# Patient Record
Sex: MALE | Race: WHITE | NOT HISPANIC OR LATINO | Employment: FULL TIME | URBAN - METROPOLITAN AREA
[De-identification: names, ages, dates, MRNs, and addresses within clinical notes are randomized per-mention and may not be internally consistent; named-entity substitution may affect disease eponyms.]

---

## 2023-09-15 ENCOUNTER — HOSPITAL ENCOUNTER (OUTPATIENT)
Dept: DATA CONVERSION | Facility: HOSPITAL | Age: 48
Discharge: HOME | End: 2023-09-15
Payer: COMMERCIAL

## 2023-09-15 DIAGNOSIS — Z00.00 ENCOUNTER FOR GENERAL ADULT MEDICAL EXAMINATION WITHOUT ABNORMAL FINDINGS: ICD-10-CM

## 2023-09-15 DIAGNOSIS — Z11.59 ENCOUNTER FOR SCREENING FOR OTHER VIRAL DISEASES: ICD-10-CM

## 2023-09-15 DIAGNOSIS — E55.9 VITAMIN D DEFICIENCY, UNSPECIFIED: ICD-10-CM

## 2023-09-15 DIAGNOSIS — Z13.29 ENCOUNTER FOR SCREENING FOR OTHER SUSPECTED ENDOCRINE DISORDER: ICD-10-CM

## 2023-09-15 DIAGNOSIS — Z12.5 ENCOUNTER FOR SCREENING FOR MALIGNANT NEOPLASM OF PROSTATE: ICD-10-CM

## 2023-09-15 DIAGNOSIS — R25.2 CRAMP AND SPASM: ICD-10-CM

## 2023-09-15 DIAGNOSIS — E78.5 HYPERLIPIDEMIA, UNSPECIFIED: ICD-10-CM

## 2023-09-15 LAB
25(OH)D3 SERPL-MCNC: 30 NG/ML (ref 31–100)
ALBUMIN SERPL-MCNC: 4.2 GM/DL (ref 3.5–5)
ALBUMIN/GLOB SERPL: 1.4 RATIO (ref 1.5–3)
ALP BLD-CCNC: 73 U/L (ref 35–125)
ALT SERPL-CCNC: 24 U/L (ref 5–40)
ANION GAP SERPL CALCULATED.3IONS-SCNC: 8 MMOL/L (ref 0–19)
APPEARANCE PLAS: CLEAR
AST SERPL-CCNC: 17 U/L (ref 5–40)
BILIRUB SERPL-MCNC: 0.2 MG/DL (ref 0.1–1.2)
BUN SERPL-MCNC: 26 MG/DL (ref 8–25)
BUN/CREAT SERPL: 26 RATIO (ref 8–21)
CALCIUM SERPL-MCNC: 9.5 MG/DL (ref 8.5–10.4)
CHLORIDE SERPL-SCNC: 108 MMOL/L (ref 97–107)
CHOLEST SERPL-MCNC: 135 MG/DL (ref 133–200)
CHOLEST/HDLC SERPL: 3.6 RATIO
CO2 SERPL-SCNC: 28 MMOL/L (ref 24–31)
COLOR SPUN FLD: YELLOW
CREAT SERPL-MCNC: 1 MG/DL (ref 0.4–1.6)
DEPRECATED RDW RBC AUTO: 38.5 FL (ref 37–54)
ERYTHROCYTE [DISTWIDTH] IN BLOOD BY AUTOMATED COUNT: 12.3 % (ref 11.7–15)
FASTING STATUS PATIENT QL REPORTED: ABNORMAL
GFR SERPL CREATININE-BSD FRML MDRD: 93 ML/MIN/1.73 M2
GLOBULIN SER-MCNC: 3 G/DL (ref 1.9–3.7)
GLUCOSE SERPL-MCNC: 113 MG/DL (ref 65–99)
HCT VFR BLD AUTO: 45.5 % (ref 41–50)
HCV AB SER QL: NORMAL
HDLC SERPL-MCNC: 37 MG/DL
HGB BLD-MCNC: 15.7 GM/DL (ref 13.5–16.5)
LDLC SERPL CALC-MCNC: 62 MG/DL (ref 65–130)
MAGNESIUM SERPL-MCNC: 1.9 MG/DL (ref 1.6–3.1)
MCH RBC QN AUTO: 29.5 PG (ref 26–34)
MCHC RBC AUTO-ENTMCNC: 34.5 % (ref 31–37)
MCV RBC AUTO: 85.5 FL (ref 80–100)
NRBC BLD-RTO: 0 /100 WBC
PLATELET # BLD AUTO: 193 K/UL (ref 150–450)
PMV BLD AUTO: 11.1 CU (ref 7–12.6)
POTASSIUM SERPL-SCNC: 5.6 MMOL/L (ref 3.4–5.1)
PROT SERPL-MCNC: 7.2 G/DL (ref 5.9–7.9)
PSA SERPL-MCNC: 1.4 NG/ML (ref 0–4.1)
RBC # BLD AUTO: 5.32 M/UL (ref 4.5–5.5)
SODIUM SERPL-SCNC: 144 MMOL/L (ref 133–145)
TRIGL SERPL-MCNC: 182 MG/DL (ref 40–150)
TSH SERPL DL<=0.05 MIU/L-ACNC: 0.84 MIU/L (ref 0.27–4.2)
WBC # BLD AUTO: 5.4 K/UL (ref 4.5–11)

## 2023-10-10 DIAGNOSIS — E11.65 TYPE 2 DIABETES MELLITUS WITH HYPERGLYCEMIA, WITHOUT LONG-TERM CURRENT USE OF INSULIN (MULTI): Primary | ICD-10-CM

## 2023-10-13 ENCOUNTER — TELEPHONE (OUTPATIENT)
Dept: PRIMARY CARE | Facility: CLINIC | Age: 48
End: 2023-10-13
Payer: COMMERCIAL

## 2023-10-13 RX ORDER — TIRZEPATIDE 5 MG/.5ML
0.5 INJECTION, SOLUTION SUBCUTANEOUS
Qty: 2 ML | Refills: 1 | Status: SHIPPED | OUTPATIENT
Start: 2023-10-13 | End: 2023-12-27

## 2023-10-13 RX ORDER — TIRZEPATIDE 5 MG/.5ML
2 INJECTION, SOLUTION SUBCUTANEOUS
COMMUNITY
Start: 2023-07-18 | End: 2023-10-13 | Stop reason: SDUPTHER

## 2023-11-15 PROBLEM — S46.011A STRAIN OF RIGHT ROTATOR CUFF CAPSULE: Status: ACTIVE | Noted: 2023-11-15

## 2023-11-15 PROBLEM — M19.019 DJD OF AC (ACROMIOCLAVICULAR) JOINT: Status: ACTIVE | Noted: 2023-11-15

## 2023-11-15 PROBLEM — M95.8 WINGED SCAPULA OF BOTH SIDES: Status: ACTIVE | Noted: 2023-11-15

## 2023-11-15 PROBLEM — S49.90XA INJURY OF GLENOID LABRUM: Status: ACTIVE | Noted: 2023-11-15

## 2023-11-15 PROBLEM — S46.211A STRAIN OF RIGHT BICEPS: Status: ACTIVE | Noted: 2023-11-15

## 2023-11-15 PROBLEM — M25.511 RIGHT SHOULDER PAIN: Status: ACTIVE | Noted: 2023-11-15

## 2023-11-15 NOTE — PATIENT INSTRUCTIONS
May alternate moist heat and ice as needed for pain management   Once again,reviewed handout rotator cuff injury and/or labral injury in detail with the patient to the level of their understanding; a copy of this handout was provided to the patient at the time of this office visit.    Continue Physical Therapy 1-2 times a week for 8-10 weeks with manual therapy as well as dry needling and IASTM    Reviewed home exercises to be performed by the patient routinely, including wall crawls, circumduction exercises, resistance band exercises, as well as additional exercises from rotator cuff and/or labral    Once again, recommendation over-the-counter calcium with vitamin-D 2 -3000+ milligrams a day, as well as OTC symphytum as directed daily to promote bony healing, in addition to a daily multivitamin.    Once again, recommendation over-the-counter curcumin, turmeric, boswellia, as well as egg shell membrane as directed to aid with joint inflammation.    Once again, recommendation over-the-counter Move Free for joint health.    May alternate between Advil liquid gels and Tylenol every 6-8 hours to alleviate pain as needed.    Patient advised regarding the risks and/or potential adverse reactions and/or side effects of any prescribed medications along with any over-the-counter medications or any supplements used. Patient advised to seek immediate medical care if any adverse reactions occur. The patient and/or patient(s) parent(s) verbalized their understanding.  Once again, discussed in detail with the patient to the level of their understanding the possibility in the future of regenerative injections versus corticosteroid injections  Once again, discussed in detail with the patient to the level of their understanding the possibility in the future of MR Arthrogram of the patient's RIGHT shoulder.   Once again,  MRI of the RIGHT shoulder to rule out rotator cuff tear versus labral pathology as well as further evaluation of  the proximal biceps tendon versus fracture versus other  Patient aware of the possibility of surgical intervention in the future  Discussed with patient better ways and proper technique for workouts with modifications  Follow up after MR arthrogram of the RIGHT shoulder or sooner if needed.

## 2023-11-20 ENCOUNTER — OFFICE VISIT (OUTPATIENT)
Dept: SPORTS MEDICINE | Facility: CLINIC | Age: 48
End: 2023-11-20
Payer: COMMERCIAL

## 2023-11-20 VITALS
WEIGHT: 180 LBS | BODY MASS INDEX: 25.77 KG/M2 | DIASTOLIC BLOOD PRESSURE: 82 MMHG | SYSTOLIC BLOOD PRESSURE: 122 MMHG | HEIGHT: 70 IN | HEART RATE: 62 BPM

## 2023-11-20 DIAGNOSIS — Q89.9 BIRTH DEFECT: ICD-10-CM

## 2023-11-20 DIAGNOSIS — M19.019 DJD OF AC (ACROMIOCLAVICULAR) JOINT: ICD-10-CM

## 2023-11-20 DIAGNOSIS — M95.8 WINGED SCAPULA OF BOTH SIDES: ICD-10-CM

## 2023-11-20 DIAGNOSIS — S46.011D STRAIN OF RIGHT ROTATOR CUFF CAPSULE, SUBSEQUENT ENCOUNTER: ICD-10-CM

## 2023-11-20 DIAGNOSIS — S49.91XD INJURY OF GLENOID LABRUM, RIGHT, SUBSEQUENT ENCOUNTER: ICD-10-CM

## 2023-11-20 DIAGNOSIS — M25.511 RIGHT SHOULDER PAIN, UNSPECIFIED CHRONICITY: ICD-10-CM

## 2023-11-20 DIAGNOSIS — S46.211D STRAIN OF RIGHT BICEPS, SUBSEQUENT ENCOUNTER: ICD-10-CM

## 2023-11-20 PROCEDURE — 1036F TOBACCO NON-USER: CPT | Performed by: FAMILY MEDICINE

## 2023-11-20 PROCEDURE — 99214 OFFICE O/P EST MOD 30 MIN: CPT | Performed by: FAMILY MEDICINE

## 2023-11-20 RX ORDER — LANCETS 30 GAUGE
1 EACH MISCELLANEOUS DAILY
COMMUNITY
Start: 2022-12-16

## 2023-11-20 RX ORDER — TESTOSTERONE 20.25 MG/1.25G
1 GEL TOPICAL DAILY
COMMUNITY
Start: 2018-02-14 | End: 2024-01-18 | Stop reason: ALTCHOICE

## 2023-11-20 RX ORDER — LANCETS 33 GAUGE
1 EACH MISCELLANEOUS DAILY
COMMUNITY
Start: 2023-04-18 | End: 2024-06-07 | Stop reason: ALTCHOICE

## 2023-11-20 RX ORDER — IBUPROFEN 400 MG/1
400 TABLET ORAL EVERY 6 HOURS PRN
COMMUNITY

## 2023-11-20 RX ORDER — ERGOCALCIFEROL (VITAMIN D2) 50 MCG
1 CAPSULE ORAL EVERY 24 HOURS
COMMUNITY
Start: 2023-09-17 | End: 2024-01-18 | Stop reason: ALTCHOICE

## 2023-11-20 RX ORDER — TRIAMCINOLONE ACETONIDE 1 MG/G
1 CREAM TOPICAL 2 TIMES DAILY
COMMUNITY
Start: 2023-03-05 | End: 2024-01-18 | Stop reason: ALTCHOICE

## 2023-11-20 RX ORDER — DAPAGLIFLOZIN 10 MG/1
10 TABLET, FILM COATED ORAL DAILY
COMMUNITY
End: 2024-01-03 | Stop reason: SDUPTHER

## 2023-11-20 RX ORDER — PANTOPRAZOLE SODIUM 40 MG/1
40 TABLET, DELAYED RELEASE ORAL DAILY
COMMUNITY

## 2023-11-20 RX ORDER — BLOOD SUGAR DIAGNOSTIC
1 STRIP MISCELLANEOUS DAILY
COMMUNITY
End: 2024-06-07 | Stop reason: ALTCHOICE

## 2023-11-20 ASSESSMENT — PAIN DESCRIPTION - DESCRIPTORS: DESCRIPTORS: ACHING;SHARP;STABBING

## 2023-11-20 ASSESSMENT — PAIN SCALES - GENERAL: PAINLEVEL_OUTOF10: 1

## 2023-11-20 ASSESSMENT — PAIN - FUNCTIONAL ASSESSMENT: PAIN_FUNCTIONAL_ASSESSMENT: 0-10

## 2023-12-27 DIAGNOSIS — E11.65 TYPE 2 DIABETES MELLITUS WITH HYPERGLYCEMIA, WITHOUT LONG-TERM CURRENT USE OF INSULIN (MULTI): ICD-10-CM

## 2023-12-27 RX ORDER — TIRZEPATIDE 5 MG/.5ML
INJECTION, SOLUTION SUBCUTANEOUS
Qty: 2 ML | Refills: 2 | Status: SHIPPED | OUTPATIENT
Start: 2023-12-27 | End: 2024-01-18 | Stop reason: WASHOUT

## 2024-01-03 DIAGNOSIS — E11.65 TYPE 2 DIABETES MELLITUS WITH HYPERGLYCEMIA, WITHOUT LONG-TERM CURRENT USE OF INSULIN (MULTI): Primary | ICD-10-CM

## 2024-01-03 RX ORDER — DAPAGLIFLOZIN 10 MG/1
10 TABLET, FILM COATED ORAL DAILY
Qty: 30 TABLET | Refills: 2 | Status: SHIPPED | OUTPATIENT
Start: 2024-01-03 | End: 2024-06-07 | Stop reason: WASHOUT

## 2024-01-18 ENCOUNTER — OFFICE VISIT (OUTPATIENT)
Dept: PRIMARY CARE | Facility: CLINIC | Age: 49
End: 2024-01-18
Payer: COMMERCIAL

## 2024-01-18 ENCOUNTER — LAB (OUTPATIENT)
Dept: LAB | Facility: LAB | Age: 49
End: 2024-01-18
Payer: COMMERCIAL

## 2024-01-18 VITALS
HEIGHT: 70 IN | RESPIRATION RATE: 16 BRPM | WEIGHT: 188.6 LBS | HEART RATE: 71 BPM | BODY MASS INDEX: 27 KG/M2 | OXYGEN SATURATION: 96 % | SYSTOLIC BLOOD PRESSURE: 117 MMHG | DIASTOLIC BLOOD PRESSURE: 83 MMHG

## 2024-01-18 DIAGNOSIS — M79.10 MUSCLE PAIN: ICD-10-CM

## 2024-01-18 DIAGNOSIS — E11.9 TYPE 2 DIABETES MELLITUS WITHOUT COMPLICATION, WITHOUT LONG-TERM CURRENT USE OF INSULIN (MULTI): ICD-10-CM

## 2024-01-18 DIAGNOSIS — H53.9 CHANGES IN VISION: ICD-10-CM

## 2024-01-18 DIAGNOSIS — Z09 FOLLOW-UP EXAM, 3-6 MONTHS SINCE PREVIOUS EXAM: ICD-10-CM

## 2024-01-18 DIAGNOSIS — E11.9 TYPE 2 DIABETES MELLITUS WITHOUT COMPLICATION, WITHOUT LONG-TERM CURRENT USE OF INSULIN (MULTI): Primary | ICD-10-CM

## 2024-01-18 DIAGNOSIS — E87.5 HYPERKALEMIA: ICD-10-CM

## 2024-01-18 DIAGNOSIS — E11.65 TYPE 2 DIABETES MELLITUS WITH HYPERGLYCEMIA, WITHOUT LONG-TERM CURRENT USE OF INSULIN (MULTI): ICD-10-CM

## 2024-01-18 PROBLEM — G47.33 OSA (OBSTRUCTIVE SLEEP APNEA): Status: ACTIVE | Noted: 2024-01-18

## 2024-01-18 PROBLEM — K21.9 GASTROESOPHAGEAL REFLUX DISEASE WITHOUT ESOPHAGITIS: Status: ACTIVE | Noted: 2024-01-18

## 2024-01-18 PROBLEM — E29.1 PRIMARY HYPOGONADISM IN MALE: Status: ACTIVE | Noted: 2024-01-18

## 2024-01-18 PROBLEM — E78.1 PURE HYPERTRIGLYCERIDEMIA: Status: ACTIVE | Noted: 2024-01-18

## 2024-01-18 PROBLEM — E55.9 VITAMIN D DEFICIENCY DISEASE: Status: ACTIVE | Noted: 2024-01-18

## 2024-01-18 LAB
ALBUMIN SERPL-MCNC: 4.4 G/DL (ref 3.5–5)
ALP BLD-CCNC: 75 U/L (ref 35–125)
ALT SERPL-CCNC: 27 U/L (ref 5–40)
ANION GAP SERPL CALC-SCNC: 9 MMOL/L
AST SERPL-CCNC: 21 U/L (ref 5–40)
BILIRUB SERPL-MCNC: 0.3 MG/DL (ref 0.1–1.2)
BUN SERPL-MCNC: 27 MG/DL (ref 8–25)
CALCIUM SERPL-MCNC: 9.7 MG/DL (ref 8.5–10.4)
CHLORIDE SERPL-SCNC: 103 MMOL/L (ref 97–107)
CO2 SERPL-SCNC: 28 MMOL/L (ref 24–31)
CREAT SERPL-MCNC: 1.1 MG/DL (ref 0.4–1.6)
CREAT UR-MCNC: 125.9 MG/DL
EGFRCR SERPLBLD CKD-EPI 2021: 83 ML/MIN/1.73M*2
EST. AVERAGE GLUCOSE BLD GHB EST-MCNC: 114 MG/DL
GLUCOSE SERPL-MCNC: 112 MG/DL (ref 65–99)
HBA1C MFR BLD: 5.6 %
MAGNESIUM SERPL-MCNC: 2.1 MG/DL (ref 1.6–3.1)
MICROALBUMIN UR-MCNC: <12 MG/L (ref 0–23)
MICROALBUMIN/CREAT UR: NORMAL MG/G{CREAT}
POTASSIUM SERPL-SCNC: 5.1 MMOL/L (ref 3.4–5.1)
PROT SERPL-MCNC: 6.8 G/DL (ref 5.9–7.9)
SODIUM SERPL-SCNC: 140 MMOL/L (ref 133–145)

## 2024-01-18 PROCEDURE — 36415 COLL VENOUS BLD VENIPUNCTURE: CPT

## 2024-01-18 PROCEDURE — 82570 ASSAY OF URINE CREATININE: CPT

## 2024-01-18 PROCEDURE — 83036 HEMOGLOBIN GLYCOSYLATED A1C: CPT

## 2024-01-18 PROCEDURE — 1036F TOBACCO NON-USER: CPT

## 2024-01-18 PROCEDURE — 3079F DIAST BP 80-89 MM HG: CPT

## 2024-01-18 PROCEDURE — 83735 ASSAY OF MAGNESIUM: CPT

## 2024-01-18 PROCEDURE — 99213 OFFICE O/P EST LOW 20 MIN: CPT

## 2024-01-18 PROCEDURE — 80053 COMPREHEN METABOLIC PANEL: CPT

## 2024-01-18 PROCEDURE — 3074F SYST BP LT 130 MM HG: CPT

## 2024-01-18 PROCEDURE — 82043 UR ALBUMIN QUANTITATIVE: CPT

## 2024-01-18 RX ORDER — TIRZEPATIDE 7.5 MG/.5ML
7.5 INJECTION, SOLUTION SUBCUTANEOUS
COMMUNITY
End: 2024-01-18 | Stop reason: SDUPTHER

## 2024-01-18 ASSESSMENT — PAIN SCALES - GENERAL: PAINLEVEL: 0-NO PAIN

## 2024-01-18 ASSESSMENT — PATIENT HEALTH QUESTIONNAIRE - PHQ9
1. LITTLE INTEREST OR PLEASURE IN DOING THINGS: NOT AT ALL
SUM OF ALL RESPONSES TO PHQ9 QUESTIONS 1 AND 2: 0
2. FEELING DOWN, DEPRESSED OR HOPELESS: NOT AT ALL

## 2024-01-18 ASSESSMENT — COLUMBIA-SUICIDE SEVERITY RATING SCALE - C-SSRS
1. IN THE PAST MONTH, HAVE YOU WISHED YOU WERE DEAD OR WISHED YOU COULD GO TO SLEEP AND NOT WAKE UP?: NO
6. HAVE YOU EVER DONE ANYTHING, STARTED TO DO ANYTHING, OR PREPARED TO DO ANYTHING TO END YOUR LIFE?: NO
2. HAVE YOU ACTUALLY HAD ANY THOUGHTS OF KILLING YOURSELF?: NO

## 2024-01-18 ASSESSMENT — ENCOUNTER SYMPTOMS
LOSS OF SENSATION IN FEET: 0
OCCASIONAL FEELINGS OF UNSTEADINESS: 0
DEPRESSION: 0

## 2024-01-18 NOTE — PATIENT INSTRUCTIONS
Try Biotin for hair loss.   Decrease intake of saturated fats, fast food, sweets.  Increase intake of fresh fruit fresh vegetables and lean meats.  Increase healthy fats seeds, nuts, olive oil instead of butter.  walk 150 minutes/week for heart health.    Continue current medication.  Continue work on diet - recommend lots of fruits and vegetables, lean protein like chicken, turkey, fish, beans and Greek yogurt. Try to choose healthier carbohydrate options like oatmeal, wheat bread and pasta, sweet potatoes. Limit sugary treats.  Check a fasting sugar first thing in the AM twice daily and keep a log of the results to bring to your next office visit.  Please contact office if your sugars are consistently >140.  Reevaluate in 3 months.

## 2024-01-18 NOTE — PROGRESS NOTES
"Subjective   Patient ID: Juan Rodriguez is a 48 y.o. male who presents for Follow-up.    HPI   Patient presents for 3-month follow-up.  Typically blood sugars run 98-1 33 after eating.  He notes he continues to eat high-protein diet with lots of raspberries and strawberries.  He did not follow-up with neurology as referred last time for vision changes.  But he notes it has not happened until \"maybe this morning\".  He does have an MRI scheduled to beginning of February for his right shoulder he is currently working with Dr. David Admits to urinary frequency and urgency.  He does continue to have left shin muscle soreness and now right forearm muscle soreness.  He would like to increase his dose of Mounjaro if possible.He notes that over the past 2-1/2 months he has not been taking his Farxiga or metformin and would like to remain off of them if his A1c is okay.  Denies any chest pain, chest pressure, shortness of breath, constipation, diarrhea, blood in stool, blood in urine, numbness or tingling in fingers or toes.  Denies any falls, urgent care, ER, hospitalization, new diagnoses or surgeries since he was here last.  Review of Systems  Review of Systems negative except as noted in HPI and Chief complaint.    Current Outpatient Medications:     albuterol 108 (90 Base) MCG/ACT inhaler, Inhale 1 puff every 4 hours., Disp: , Rfl:     Farxiga 10 mg, Take 1 tablet (10 mg) by mouth once daily., Disp: 30 tablet, Rfl: 2    ibuprofen 400 mg tablet, Take 1 tablet (400 mg) by mouth every 6 hours if needed., Disp: , Rfl:     OneTouch Delica Plus Lancet 30 gauge misc, Inject 1 each under the skin once daily., Disp: , Rfl:     OneTouch Ultra Test strip, Inject 1 each under the skin once daily., Disp: , Rfl:     OneTouch Ultra2 Meter misc, Inject 1 each under the skin once daily., Disp: , Rfl:     pantoprazole (ProtoNix) 40 mg EC tablet, Take 1 tablet (40 mg) by mouth once daily., Disp: , Rfl:       Objective   /83 (BP " "Location: Left arm, Patient Position: Sitting, BP Cuff Size: Adult)   Pulse 71   Resp 16   Ht 1.778 m (5' 10\")   Wt 85.5 kg (188 lb 9.6 oz)   SpO2 96%   BMI 27.06 kg/m²     Physical Exam  Vitals reviewed.   Constitutional:       Appearance: Normal appearance.   Cardiovascular:      Rate and Rhythm: Normal rate.   Pulmonary:      Effort: Pulmonary effort is normal.      Breath sounds: Normal breath sounds.   Musculoskeletal:         General: Normal range of motion.   Neurological:      General: No focal deficit present.      Mental Status: He is alert and oriented to person, place, and time. Mental status is at baseline.   Psychiatric:         Mood and Affect: Mood normal.         Behavior: Behavior normal.         Thought Content: Thought content normal.         Judgment: Judgment normal.     Assessment/Plan   Diagnoses and all orders for this visit:  Type 2 diabetes mellitus without complication, without long-term current use of insulin (CMS/HCC)  -     Hemoglobin A1C; Future  -     Albumin , Urine Random; Future  Follow-up exam, 3-6 months since previous exam  Hyperkalemia  -     Comprehensive metabolic panel; Future  Type 2 diabetes mellitus with hyperglycemia, without long-term current use of insulin (CMS/HCC)  Muscle pain  -     Magnesium; Future  -     Referral to Neurology; Future  Changes in vision  -     Referral to Neurology; Future  Other orders  -     Follow Up In Primary Care - Health Maintenance; Future  Try Biotin for hair loss.   Decrease intake of saturated fats, fast food, sweets.  Increase intake of fresh fruit fresh vegetables and lean meats.  Increase healthy fats seeds, nuts, olive oil instead of butter.  walk 150 minutes/week for heart health.    Continue current medication.  Continue work on diet - recommend lots of fruits and vegetables, lean protein like chicken, turkey, fish, beans and Greek yogurt. Try to choose healthier carbohydrate options like oatmeal, wheat bread and pasta, " sweet potatoes. Limit sugary treats.  Check a fasting sugar first thing in the AM twice daily and keep a log of the results to bring to your next office visit.  Please contact office if your sugars are consistently >140.  Reevaluate in 3 months.   *This note was dictated using DRAGON speech recognition software and was corrected for spelling or grammatical errors, but despite proofreading several typographical errors might be present that might affect the meaning of the content.*  Arabella Beltran, CNP

## 2024-01-19 RX ORDER — TIRZEPATIDE 7.5 MG/.5ML
7.5 INJECTION, SOLUTION SUBCUTANEOUS
Qty: 2 ML | Refills: 2 | Status: SHIPPED | OUTPATIENT
Start: 2024-01-19 | End: 2024-02-29 | Stop reason: SINTOL

## 2024-02-02 ENCOUNTER — APPOINTMENT (OUTPATIENT)
Dept: RADIOLOGY | Facility: HOSPITAL | Age: 49
End: 2024-02-02
Payer: COMMERCIAL

## 2024-02-12 DIAGNOSIS — E11.9 TYPE 2 DIABETES MELLITUS WITHOUT COMPLICATION, WITHOUT LONG-TERM CURRENT USE OF INSULIN (MULTI): ICD-10-CM

## 2024-02-12 RX ORDER — METFORMIN HYDROCHLORIDE 1000 MG/1
1000 TABLET ORAL
COMMUNITY
End: 2024-02-12 | Stop reason: SDUPTHER

## 2024-02-12 RX ORDER — METFORMIN HYDROCHLORIDE 1000 MG/1
1000 TABLET ORAL
Qty: 180 TABLET | Refills: 1 | Status: SHIPPED | OUTPATIENT
Start: 2024-02-12 | End: 2024-06-07 | Stop reason: WASHOUT

## 2024-02-23 ENCOUNTER — HOSPITAL ENCOUNTER (OUTPATIENT)
Dept: RADIOLOGY | Facility: HOSPITAL | Age: 49
Discharge: HOME | End: 2024-02-23
Payer: COMMERCIAL

## 2024-02-23 VITALS
SYSTOLIC BLOOD PRESSURE: 142 MMHG | DIASTOLIC BLOOD PRESSURE: 85 MMHG | OXYGEN SATURATION: 100 % | HEART RATE: 59 BPM | RESPIRATION RATE: 17 BRPM

## 2024-02-23 DIAGNOSIS — S49.91XD INJURY OF GLENOID LABRUM, RIGHT, SUBSEQUENT ENCOUNTER: ICD-10-CM

## 2024-02-23 DIAGNOSIS — S46.211D STRAIN OF RIGHT BICEPS, SUBSEQUENT ENCOUNTER: ICD-10-CM

## 2024-02-23 DIAGNOSIS — S46.011D STRAIN OF RIGHT ROTATOR CUFF CAPSULE, SUBSEQUENT ENCOUNTER: ICD-10-CM

## 2024-02-23 DIAGNOSIS — M25.511 RIGHT SHOULDER PAIN, UNSPECIFIED CHRONICITY: ICD-10-CM

## 2024-02-23 DIAGNOSIS — Q89.9 BIRTH DEFECT: ICD-10-CM

## 2024-02-23 DIAGNOSIS — M19.019 DJD OF AC (ACROMIOCLAVICULAR) JOINT: ICD-10-CM

## 2024-02-23 DIAGNOSIS — M95.8 WINGED SCAPULA OF BOTH SIDES: ICD-10-CM

## 2024-02-23 PROCEDURE — 73222 MRI JOINT UPR EXTREM W/DYE: CPT | Mod: RIGHT SIDE | Performed by: RADIOLOGY

## 2024-02-23 PROCEDURE — 73040 CONTRAST X-RAY OF SHOULDER: CPT | Mod: RT

## 2024-02-23 PROCEDURE — 77002 NEEDLE LOCALIZATION BY XRAY: CPT | Mod: RIGHT SIDE | Performed by: RADIOLOGY

## 2024-02-23 PROCEDURE — A9575 INJ GADOTERATE MEGLUMI 0.1ML: HCPCS | Performed by: FAMILY MEDICINE

## 2024-02-23 PROCEDURE — 73222 MRI JOINT UPR EXTREM W/DYE: CPT | Mod: RT

## 2024-02-23 PROCEDURE — 2550000001 HC RX 255 CONTRASTS: Performed by: FAMILY MEDICINE

## 2024-02-23 PROCEDURE — 23350 INJECTION FOR SHOULDER X-RAY: CPT | Mod: RIGHT SIDE | Performed by: RADIOLOGY

## 2024-02-23 RX ORDER — GADOTERATE MEGLUMINE 376.9 MG/ML
0.1 INJECTION INTRAVENOUS
Status: COMPLETED | OUTPATIENT
Start: 2024-02-23 | End: 2024-02-23

## 2024-02-23 RX ORDER — BUPIVACAINE HYDROCHLORIDE 2.5 MG/ML
INJECTION, SOLUTION EPIDURAL; INFILTRATION; INTRACAUDAL
Status: DISCONTINUED
Start: 2024-02-23 | End: 2024-02-23 | Stop reason: WASHOUT

## 2024-02-23 RX ORDER — TRIAMCINOLONE ACETONIDE 40 MG/ML
INJECTION, SUSPENSION INTRA-ARTICULAR; INTRAMUSCULAR
Status: DISCONTINUED
Start: 2024-02-23 | End: 2024-02-23 | Stop reason: WASHOUT

## 2024-02-23 RX ORDER — LIDOCAINE HYDROCHLORIDE 10 MG/ML
8 INJECTION, SOLUTION EPIDURAL; INFILTRATION; INTRACAUDAL; PERINEURAL ONCE
Status: CANCELLED | OUTPATIENT
Start: 2024-02-23 | End: 2024-02-23

## 2024-02-23 RX ORDER — LIDOCAINE HYDROCHLORIDE 10 MG/ML
5 INJECTION, SOLUTION EPIDURAL; INFILTRATION; INTRACAUDAL; PERINEURAL ONCE
Status: CANCELLED | OUTPATIENT
Start: 2024-02-23 | End: 2024-02-23

## 2024-02-23 RX ADMIN — IOHEXOL 3 ML: 240 INJECTION, SOLUTION INTRATHECAL; INTRAVASCULAR; INTRAVENOUS; ORAL at 11:58

## 2024-02-23 RX ADMIN — GADOTERATE MEGLUMINE 0.1 ML: 376.9 INJECTION INTRAVENOUS at 11:58

## 2024-02-23 ASSESSMENT — PAIN - FUNCTIONAL ASSESSMENT: PAIN_FUNCTIONAL_ASSESSMENT: 0-10

## 2024-02-23 ASSESSMENT — PAIN SCALES - GENERAL: PAINLEVEL_OUTOF10: 0 - NO PAIN

## 2024-02-23 NOTE — DISCHARGE INSTRUCTIONS
Take it easy today.  No heavy lifting. Nothing heavier than a gallon of milk.  May drive.  Remove dressing tomorrow and you may shower tomorrow.  May take tylenol as needed for pain.  Follow up with Dr. David   In 10 days

## 2024-02-23 NOTE — POST-PROCEDURE NOTE
Interventional Radiology Brief Postprocedure Note    Attending: Jono Stevens MD     Assistant: None    Diagnosis: Right shoulder pain    Description of procedure: Fluoroscopically guided right shoulder pre imaging arthrogram injection     Anesthesia:  Local    Complications: None    Estimated Blood Loss: minimal    Medications (Filter: Administrations occurring from 1133 to 1153 on 02/23/24) As of 02/23/24 1153      None          No specimens collected      See detailed result report with images in PACS.    The patient tolerated the procedure well without incident or complication and is in stable condition.

## 2024-02-23 NOTE — PRE-PROCEDURE NOTE
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    Relevant review of systems: NA    Relevant Labs:   Lab Results   Component Value Date    CREATININE 1.10 01/18/2024    EGFR 83 01/18/2024       Planned Sedation/Anesthesia: Minimal    Airway assessment: normal    Directed physical examination:    NA    Mallampati:  NA    ASA Score: ASA 1 - Normal health patient    Benefits, risks and alternatives of procedure and planned sedation have been discussed with the patient and/or their representative. All questions answered and they agree to proceed.

## 2024-02-26 ENCOUNTER — TELEPHONE (OUTPATIENT)
Dept: SPORTS MEDICINE | Facility: CLINIC | Age: 49
End: 2024-02-26
Payer: COMMERCIAL

## 2024-02-26 NOTE — LETTER
February 26, 2024     Juan Rodriguez    Patient: Juan Rodriguez   YOB: 1975   Date of Visit: 2/26/2024     Good Morning Adria:  Please call Dr. David's office at [phone] Option #1 to schedule a follow-up appointment to discuss your MRI results.    Thanks!  Haylee Martinez, AT  Sports Medicine- Office of Dr. Mulugeta David, DO         ______________________________________________________________________________________

## 2024-02-28 DIAGNOSIS — E11.9 TYPE 2 DIABETES MELLITUS WITHOUT COMPLICATION, WITHOUT LONG-TERM CURRENT USE OF INSULIN (MULTI): Primary | ICD-10-CM

## 2024-02-28 NOTE — PROGRESS NOTES
Verbal consent of the patient and/or verbal parental consent for patients under the age of 18 have been obtained to conduct a physical examination at this office visit.    Established patient  History Of Present Illness  03/05/24 Juan Rodriguez is a 48 y.o. male who is present today  to go over his MRI of his right shoulder and reevaluation of his RIGHT shoulder. He states he has been hurting more since the contrast injection from the arthrogram. Currently he feels his pain as 1/10 and aching. He feels pain to the lateral aspect of the shoulder which worsens with planking and exercises where he must stabilize the shoulder against walls or the ground. He states that he continues to work with a  at [employer] and will modify exercises as needed. Often times he does lose strength as he is lifting logs in the yard and throwing them into a truck bed. 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 He also experiences pain when lying with his arm about his head or when his arm is elevated at a computer desk and he must raise if off. He denies any numbness or tingling in the arm. He is not currently treating his pain with any medications as he tries to avoid them so he will modify his activity when it hurts. He has not noticed any improvements.  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      All previous Progress Notes and imaging results related to this patients chief complaint have been reviewed in preparation for this examination.    Past Medical History  He has no past medical history on file.    Surgical History  He has no past surgical history on file.     Social History  He reports that he has never smoked. He has never been exposed to tobacco smoke. He has never used smokeless tobacco. He reports that he does not  drink alcohol and does not use drugs.    Family History  No family history on file.     Allergies  Penicillin v, Amoxicillin-pot clavulanate, Nyquil, and Sodium hypochlorite    Historical Clinical Intake  March 13, 2023 Verbal consent of the patient and/or verbal parental consent for patients under the age of 18 have been obtained to conduct a physical examination at this office visit. Juan is a 47 year old male who presents as a new patient who is here for an initial evaluation of his RIGHT shoulder. He initially injured his RIGHT shoulder approximately 1/2 - 2 years ago while throwing with his daughter. He had his right arm cocked back to throw the ball and felt a sharp pain primarily in the anterior aspect of his RIGHT shoulder. He denies feeling a pop, snap, or crack at that time. He denies any c/o instability. Since that time, Adria has had intermittent pain in his right shoulder that is worse with throwing and with weight-bearing activities such as a plank. He states that the pain varies from light to severe pain. Worse with throwing, weight bearing activty. Pain can get up to 8/10, currently 0/10. He denies any numbness/tingling. He is right hand dominant and works a physical job as a  and . Additionally, he does have issues with bilateral elbows that he would like evaluated at a separate visit. 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  -------------------------------------------------------  11/20/23 Juan Rodriguez is a 48 y.o. male who presents for reevaluation of his RIGHT shoulder. He has completed 6 to 8 weeks of physical Therapy and continues to perform his home exercise program as directed by PT. He reports no  improvement in functionality and pain since his last evaluation. He describes his pain as an aching, sharp and stabbing pain along the lateral aspect of his right shoulder proximal to the insertion of his biceps muscle. He notes exacerbation of pain with overhead motions, when lifting objects into his truck bed, or when putting any amount of weight onto his left arm/shoulder. He also states when throwing softball with his daughter pain will increase. He rates his pain at 1/10 today. He has a home TENS unit and takes OTC Ibuprofen and OTC Tylenol as well as applying heat and ice topically for pain management with no relief.  He says is also affecting his everyday activities as well as his sleep at night.      Review of Systems:  CONSTITUTIONAL:   Negative for weight change, loss of appetite, fatigue, weakness, fever, chills, night sweats, headaches .           HEENT:   Negative for cold, cough, sore throat, sinus pain, swollen lymph nodes.           OPHTHALMOLOGY:   Negative for diminished vision, blurred vision, loss of vision, double vision.           ALLERGY:   Negative for runny nose, scratchy throat, sinus congestion, rash, facial pressure, nasal congestion, post-nasal drip.           CARDIOLOGY:   Negative for chest pain, palpitations, murmurs, irregular heart beat, shortness of breath, leg edema, dyspnea on exertion, fatigue, dizziness.           RESPIRATORY:   Negative for chest pain, shortness of breath, swelling of the legs, asthma/copd, chest congestion, pain with breathing .           GASTROENTEROLOGY:   Negative for nausea, vomitting, heartburn, constipation, diarrhea, blood in stool, change in bowel habits, black stool.           HEMATOLOGY/LYMPH:   Negative for fatigue, loss of appetitie, easy bruising, easy bleeding, anemia, abnormal bleeding, slow healing.           ENDOCRINOLOGY:   Negative for polyuria, polydipsia, polyphagia, fatigue, weight loss, weight gain, cold intolerance, heat intolerance,  diabetes.           MUSCULOSKELETAL:   Positive  for RIGHT SHOULDER        DERMATOLOGY:   Negative for rash, bruising.           NEUROLOGY:   Negative for tingling, numbness, gait abnormality, paresthesias, weakness, sciatica.          General Examination:  GENERAL APPEARANCE: Appears well, pleasant, and cooperative, NAD.   HEENT: Normal, unremarkable.   NECK: Supple.   HEART: RRR, normal S1S2.   LUNGS: Clear to auscultation bilaterally.   ABDOMEN: Soft, NT/ND, BS present.   EXTREMITIES: POSITIVE:    Congenital abnormalities bilateral elbows  SKIN: No rash or cellulitis.   NEUROLOGIC EXAM: Awake, A&O x 3, CN's II-XII grossly intact.   PSYCH: Good eye contact, appropriate mood and affect       The Scribe, Sumi, was present in the room during the entire visit including, but not limited to the physical examination!     General (SHOULDER):  all findings improved somewhat since last visit  Location: RIGHT.   Erythema: Negative.   Edema:  Negative.   Effusion:  Negative.   Warmth:  Negative.   Ecchymosis/Bruising:  Negative.   Percussion Test:  Negative.   T[place] Test:  Negative.   Abrasions:  Negative.   Orientation: Positive Asymmetrical: At the elbows and forearms due to a congenital birth defect of shortening and anterior radial head that causes overall shortening of the forearms.   ROM: Positive: , asymmetrical, additionally noted secondary decreased range of motion of forearms into supination due to congenital birth defect BILATERAL  Forward Flexion (0-180 degrees)  Extension (0-60 degrees)  ABduction (0-180 degrees)  ADduction (30-50 degrees)  External Rotation with elbow at side (0-90 degrees) RIGHT worse than LEFT  Internal Rotation with elbow at side (0-70 degrees)  Horizontal ABduction (0-90 degrees)  Horizontal ADduction (0-45 degrees)  External Rotation with elbow at 90 degrees of ABduction [0-() degrees]  Internal Rotation with elbow at 90 degrees of ABduction [0-(70-90) degrees]  Apley's  Shoulder Scratch Test Superiorly Tests a Combination of: Flexion, External Rotation, and Scapular ABduction  Apley's Shoulder Scratch Test Inferiorly Tests a Combination of: Extension, Internal Rotation, and Scapular Adduction  LEFT arm reaches T4 RIGHT arm reaches T11            Muscle Strength:  all findings improved somewhat since last visit  Positive  +4-+5/+5: Internal Rotation - subscapularis  +4-+5/+5: External Rotation - infraspinatus and teres minor RIGHT>LEFT  +4-+5/+5: ABduction - supraspinatus and deltoid  +4-+5/+5: ABduction with thumbs down and 30 degrees horizontal ADduction - supraspinatus  +4-+5/+5:Palms up with elbow bent to 15 degrees flexion and resisted upward motion - biceps  +4-+5/+5: Simultaneous resisted supination and elbow flexion- biceps  +4-+5/+5:Flexion  +4-+5/+5:Extension  +4-+5/+5: ABduction  +4-+5/+5:ADduction  +4-+5/+5:Internal Rotation at 90 Degrees  +4-+5/+5: External Rotation at 90 Degrees  +4-+5/+5:Internal Rotation at 0 Degrees  +4-+5/+5:External Rotation at 0 Degrees  +4-+5/+5:Apley's Shoulder Scratch Test Superiorly Tests a Combination of: Flexion, External Rotation, and Scapular ABduction  +4-+5/+5: Apley's Shoulder Scratch Test Inferiorly Tests a Combination of: Extension, Internal Rotation, and Scapular ADduction  +4-+5/+5:Triceps  +4-+5/+5: Biceps            DTR/Neurological:   Negative, Symmetrical:  +2/+4 DTR's: Biceps Brachi (C5)  +2/+4 DTR's: Brachioradialis (C6)  +2/+4 DTR's: Triceps (C7).            Sensation/Neurological:    Negative, Symmetrical, Sensation intact:  C2: Lower jaw and back of head  C3: Upper neck and back of head  C4: Lower neck, upper shoulders and upper chest  C5: Area of collarbones, lateral upper arms and upper chest  C6: Lateral forearms, thumbs, anterior index finger and lateral half of the middle finger  C7: Some of posterior index finger, medial half of middle finger, upper posterior back, and back of arms  C8: Ring finger, little finger,  medial forearm and posterior upper back  T1: Medial anterior upper arm, axilla, upper chest, and posterior back     Palpation:  Positive:   still some mild Tenderness to Palpation over the rotator cuff interval and long head of the biceps tendon proximally.  all findings improved somewhat since last visit           Vascular: Negative, Symmetrical:  +2/+4: Carotid pulse  +2/+4: Radial pulse  +2/+4: Ulnar pulse  +2/+4: Brachial pulse            Negative, Symmetrical:  Capillary Refill less than 2 seconds .      Winging Scapula:  Positive: ,Bilateral.      Imaging and Diagnostics Review:  STUDY:  MR arthrogram of the right shoulder  without intravenous contrast  date 2/23/2024.      INDICATION:  Signs/Symptoms:labral tear, RTC tear, right shoulder pain      COMPARISON:  None.      ACCESSION NUMBER(S):  [id number]      ORDERING CLINICIAN:  DENIZ ARMSTRONG      TECHNIQUE:  Multiplanar multisequence MRI of the right shoulder was performed  after the fluoroscopically guided instillation of dilute gadolinium  contrast into the glenohumeral joint.      FINDINGS:  MUSCLES AND TENDONS:      There is mild articular surface fraying of the distal to insertional  supraspinatus tendon with associated mild increased intermediate  signal intensity within the substance. The infraspinatus tendon is  intact.  The teres minor tendon is intact.  There is some contrast  interdigitating into and through the substance of the subscapularis  which is felt to be iatrogenic in nature. Subscapularis tendon is  otherwise intact. The tendon of the long head of the biceps is intact  and is seated within the bicipital groove distally.  No significant  rotator cuff muscle atrophy is evident.  No mass is evident and the  suprascapular or spinoglenoid notch or the quadrangular space.      OSSEOUS STRUCTURES AND JOINTS:      There is superior glenoid labral tearing from the approximate 9:00  position in the posterosuperior quadrant to the 12:00 position.  In  the anterior superior quadrant there is contrast passing between the  glenoid labrum and the glenoid bone stock most compatible with a  sublabral foramen. The substance of the labral tissue in the anterior  superior quadrant appears to be diminutive with some associated  fraying in indistinctness such as seen at image 16 in the axial  plane. There is  no significant degenerative change of the  glenohumeral joint. There is  mild degenerative change of the  acromioclavicular joint.   The acromion is  type II  with mild  lateral downsloping.  The superior, middle, and inferior glenohumeral  ligament complexes are intact.      No fracture or dislocation is evident. Cystic changes are seen at the  greater tubercle of the humerus. Bone marrow signal intensity is  otherwise within normal limits.      SOFT TISSUES:      There is no significant volume of fluid in the subacromial subdeltoid  bursa.  There is no significant volume of fluid in the subcoracoid  bursa. Associated soft tissues of the shoulder are grossly  unremarkable.      IMPRESSION:  1. Superior glenoid labral tearing involving the posterior into  anterior labrum with superimposed sublabral foramen in the anterior  superior quadrant.  2. Mild articular surface fraying with mild tendinosis of the  supraspinatus tendon.  3. Mild acromioclavicular joint degenerative change.  4. Mild DJD gleniod humeral joint      Signed by: Jono Stevens 2/23/2024 12:57 PM  Dictation workstation:   [id number]  -------------------------------------------------  PROCEDURE: SHOULDER RT MIN 2 VIEW - TXR 0048  REASON FOR EXAM: RIGHT SHOULDER PAIN,UNSPECIFIED CHRONICITY  RESULT: MRN: [id number]     Patient Name: MAMIE DANIEL  STUDY: SHOULDER RT MIN 2 VIEW; 3/13/2023 4:48 pm  INDICATION: RIGHT SHOULDER PAIN,UNSPECIFIED CHRONICITY 47-year-old man with right shoulder pain, generalized.  COMPARISON: Left shoulder radiograph 01/04/2019  ACCESSION NUMBER(S): [id number]  ORDERING  CLINICIAN: DENIZ ARMSTRONG     TECHNIQUE: 3 views of the right shoulder were performed.     FINDINGS:   No sign of acute right shoulder fracture or dislocation. The right acromioclavicular joint space demonstrates mild narrowing. The glenohumeral joint space overall appears maintained. Incidental curvilinear radiopaque density overlying the lower neck may be related to the patient's clothing/external to the patient. Correlation with physical examination advised.            IMPRESSION:  1. Mild degenerative change of the right acromioclavicular joint.  2. No sign of acute fracture or dislocation.  3. If symptoms persist, consider MRI for further evaluation.            Dictation workstation: [id number]  Original Interpreting Physician: TOÑO DURAN MD  Original Transcribed by/Date: MMODAL Mar 13 2023 4:21P            BILATERAL elbows: possible birth defect or defect of no known origin noted of the elbows: radial heads are approximately 4 times the size limiting the patient's ability to supinate or other biomechanics involving the elbow, wrist, and possibly shoulders.     Shoulder - AC Joint:  Painful Arc 120-180 degrees:  Negative.   AC Compression Test:  Negative.   Cross Body ADduction Stress Test:  Negative.   Piano Key Sign:  Negative.   AC Distraction Test:  Negative.      Shoulder - Biceps:  Abbott-Clancy Test: Negative.   Speeds Test: Negative.   [place]anson Test: Negative.      Shoulder - Impingement:  Neer Test:  Negative.   Hawken-Kaiser Test:  Negative.   Painful Arc  degrees:  Negative.      Shoulder - Infraspinatus:  Resisted ER at 0 degrees ABduction:  Negative with arm at side.      Shoulder - Labrum/Instability: all findings improved somewhat since last visit  Anterior Apprehension Test:  Positive.   Anterior Release/Surprise Test:  Positive.   Anterior Drawer Test:  Positive.   Bicep Flexion at 90 degrees ABduction Test:  Positive.   Bicipital Load Test:  Positive.   Posterior Apprehension  "Test:  Positive.   Relocation Test:  Positive.   Sulcus Sign:  Positive.   Brand Test:  Positive.   Anterior Slide Test:  Positive.   Clunk Test:  Positive.   Grind Test:  Positive.   [place] Test:  Positive.   Crank Test:  Positive.   Load and Shift Test:  Positive.      Shoulder - Subscapularis: all findings improved somewhat since last visit  Bear Hug Test:  Negative.   Lift Off Test:  Positive.   [place] Test: Positive. patient states he starts to feel discomfort/pain with completion of this test near the site of usual pain.   Resisted Elbow Push Down Test:  Positive..   Resisted IR at 90 degrees:  Negative.   Resisted Lift Off \"\" Test:  Positive.      Shoulder - Supraspinatus: all findings improved somewhat since last visit  Full Can Test:  Positive.: with weakness while activating his traps  Empty Can Test: Positive.. with weakness  while activating his traps  Resisted Elbow Push Up Test:  Positive.  Drop Arm Test:  Negative.   Torrie Test:  Negative.   Lateral [place]rie Test:  Negative.   Zero Degree ABduction Test:  Negative.   Painful Arc  degrees:  Negative  degrees.      Shoulder - Teres Minor:  Resisted ER at 90 degrees ABduction:  Negative.      Shoulder - Vascular Thoracic Outlet Syndrome:  Shabbir Test: Negative.   Adson's Test: Negative.   Ankur's Test: Negative.   Robles Test: Negative.   [place] Test: Negative.        Assessment   1. Right shoulder pain, unspecified chronicity  Referral to Physical Therapy      2. Strain of right rotator cuff capsule, subsequent encounter  Referral to Physical Therapy      3. DJD of AC (acromioclavicular) joint  Referral to Physical Therapy      4. Osteoarthritis of right glenohumeral joint  Referral to Physical Therapy    Mild DJD gleniod humeral joint      5. Tear of right supraspinatus tendon  Referral to Physical Therapy    MRI RIGHT shoulder 2024: mild articular surface fraying with mild tendinosis of the supraspinatus tendon      6. Injury of " glenoid labrum, unspecified laterality, subsequent encounter  Referral to Physical Therapy    Superior glenoid labral tearing involving the posterior into  anterior labrum with superimposed sublabral foramen in the anterior  superior quadrant.          Treatment or Intervention:   May continue to alternate moist heat and ice as needed for pain management   Once again,reviewed rotator cuff injury and/or labral injury  and degenerative changes in detail with the patient to the level of their understanding;  at the time of this office visit.    Continue Physical Therapy for patients RIGHT shoulder including dry needling at the supra and infraspinatus areas  once again, recommendation over-the-counter  vitamin-D 2 -3000+ milligrams a day, as well as  a daily multivitamin.    Once again, recommendation over-the-counter curcumin, turmeric, boswellia, as well as egg shell membrane as directed to aid with joint inflammation.   Once again, recommendation to continue over-the-counter Move Free for joint health.    May continue to alternate between Advil liquid gels and Tylenol every 6-8 hours to alleviate pain as needed.   Patient advised regarding the risks and/or potential adverse reactions and/or side effects of any prescribed medications along with any over-the-counter medications or any supplements used. Patient advised to seek immediate medical care if any adverse reactions occur. The patient and/or patient(s) parent(s) verbalized their understanding.  Once again, discussed in detail with the patient to the level of their understanding the possibility in the future of regenerative injections versus corticosteroid injections  Patient aware of the possibility of surgical intervention in the future however at this time would like to avoid surgical intervention if possible  Discussed with patient better ways and proper technique for workouts with modifications  Reviewed RIGHT shoulder MR Arthrogram in detail with the  patient and/or patients parent/legal guardian to their level of understanding; a copy of these results were provided to the patient and/or patients parent/legal guardian at the time of this office visit.  Follow up if patient decides to undergo regenerative injections for RIGHT shoulder injury or sooner if needed.     ROHIT, Sumi Esquivel, attest this documentation has been prepared under the direction and in the presence of Deniz David D.O. By signing below, I, Deniz David D.O, personally performed the services described in this documentation. All medical record entries made by the scribe were at my direction and in my presence. I have reviewed the chart and agree that the record reflects my personal performance and is accurate and complete. Please note that this report has been partially produced using speech recognition software. It may contain errors related to grammar, punctuation or spelling. Electronically signed, but not reviewed. Deniz David D.O. Director of Sports Medicine.       DENIZ DAVID on 3/5/24 at 11:03 AM.     Deniz David DO, FAOASM

## 2024-02-29 RX ORDER — TIRZEPATIDE 5 MG/.5ML
INJECTION, SOLUTION SUBCUTANEOUS
Qty: 2 ML | Refills: 3 | Status: SHIPPED | OUTPATIENT
Start: 2024-02-29

## 2024-02-29 NOTE — TELEPHONE ENCOUNTER
Patient doesn't want to take the 7.5 dosage anymore because he is having a lot of sulfa burps and stomach pains. He wants the dosage to be back to the 5 mg. He's been taking the 7.5 weekly but they only gave him 4 pens so he only has two doses left.

## 2024-03-04 ENCOUNTER — OFFICE VISIT (OUTPATIENT)
Dept: SPORTS MEDICINE | Facility: CLINIC | Age: 49
End: 2024-03-04
Payer: COMMERCIAL

## 2024-03-04 VITALS
HEIGHT: 69 IN | DIASTOLIC BLOOD PRESSURE: 74 MMHG | WEIGHT: 182 LBS | BODY MASS INDEX: 26.96 KG/M2 | SYSTOLIC BLOOD PRESSURE: 122 MMHG | HEART RATE: 73 BPM

## 2024-03-04 DIAGNOSIS — M19.019 DJD OF AC (ACROMIOCLAVICULAR) JOINT: ICD-10-CM

## 2024-03-04 DIAGNOSIS — M75.101 TEAR OF RIGHT SUPRASPINATUS TENDON: ICD-10-CM

## 2024-03-04 DIAGNOSIS — S46.011D STRAIN OF RIGHT ROTATOR CUFF CAPSULE, SUBSEQUENT ENCOUNTER: ICD-10-CM

## 2024-03-04 DIAGNOSIS — M25.511 RIGHT SHOULDER PAIN, UNSPECIFIED CHRONICITY: Primary | ICD-10-CM

## 2024-03-04 DIAGNOSIS — M19.011 OSTEOARTHRITIS OF RIGHT GLENOHUMERAL JOINT: ICD-10-CM

## 2024-03-04 DIAGNOSIS — S49.90XD INJURY OF GLENOID LABRUM, UNSPECIFIED LATERALITY, SUBSEQUENT ENCOUNTER: ICD-10-CM

## 2024-03-04 PROCEDURE — 3062F POS MACROALBUMINURIA REV: CPT | Performed by: FAMILY MEDICINE

## 2024-03-04 PROCEDURE — 3074F SYST BP LT 130 MM HG: CPT | Performed by: FAMILY MEDICINE

## 2024-03-04 PROCEDURE — 99214 OFFICE O/P EST MOD 30 MIN: CPT | Performed by: FAMILY MEDICINE

## 2024-03-04 PROCEDURE — 3044F HG A1C LEVEL LT 7.0%: CPT | Performed by: FAMILY MEDICINE

## 2024-03-04 PROCEDURE — 3078F DIAST BP <80 MM HG: CPT | Performed by: FAMILY MEDICINE

## 2024-03-04 PROCEDURE — 1036F TOBACCO NON-USER: CPT | Performed by: FAMILY MEDICINE

## 2024-03-04 ASSESSMENT — PAIN DESCRIPTION - DESCRIPTORS: DESCRIPTORS: ACHING

## 2024-03-04 ASSESSMENT — ENCOUNTER SYMPTOMS
DEPRESSION: 0
LOSS OF SENSATION IN FEET: 0
OCCASIONAL FEELINGS OF UNSTEADINESS: 0

## 2024-03-04 ASSESSMENT — PATIENT HEALTH QUESTIONNAIRE - PHQ9
1. LITTLE INTEREST OR PLEASURE IN DOING THINGS: NOT AT ALL
2. FEELING DOWN, DEPRESSED OR HOPELESS: NOT AT ALL
SUM OF ALL RESPONSES TO PHQ9 QUESTIONS 1 AND 2: 0

## 2024-03-04 ASSESSMENT — LIFESTYLE VARIABLES
HAVE YOU OR SOMEONE ELSE BEEN INJURED AS A RESULT OF YOUR DRINKING: NO
SKIP TO QUESTIONS 9-10: 1
HOW MANY STANDARD DRINKS CONTAINING ALCOHOL DO YOU HAVE ON A TYPICAL DAY: PATIENT DOES NOT DRINK
HAS A RELATIVE, FRIEND, DOCTOR, OR ANOTHER HEALTH PROFESSIONAL EXPRESSED CONCERN ABOUT YOUR DRINKING OR SUGGESTED YOU CUT DOWN: NO
HOW OFTEN DO YOU HAVE A DRINK CONTAINING ALCOHOL: NEVER
AUDIT TOTAL SCORE: 0
AUDIT-C TOTAL SCORE: 0
HOW OFTEN DO YOU HAVE SIX OR MORE DRINKS ON ONE OCCASION: NEVER

## 2024-03-04 ASSESSMENT — PAIN - FUNCTIONAL ASSESSMENT: PAIN_FUNCTIONAL_ASSESSMENT: 0-10

## 2024-03-04 ASSESSMENT — PAIN SCALES - GENERAL
PAINLEVEL: 1
PAINLEVEL_OUTOF10: 1

## 2024-03-04 NOTE — PATIENT INSTRUCTIONS
Treatment or Intervention:   May continue to alternate moist heat and ice as needed for pain management   Once again,reviewed rotator cuff injury and/or labral injury in detail with the patient to the level of their understanding;  at the time of this office visit.    Continue Physical Therapy for patients RIGHT shoulder including dry needling at the supra and infraspinatus areas  once again, recommendation over-the-counter  vitamin-D 2 -3000+ milligrams a day, as well as  a daily multivitamin.    Once again, recommendation over-the-counter curcumin, turmeric, boswellia, as well as egg shell membrane as directed to aid with joint inflammation.   Once again, recommendation to continue over-the-counter Move Free for joint health.    May continue to alternate between Advil liquid gels and Tylenol every 6-8 hours to alleviate pain as needed.   Patient advised regarding the risks and/or potential adverse reactions and/or side effects of any prescribed medications along with any over-the-counter medications or any supplements used. Patient advised to seek immediate medical care if any adverse reactions occur. The patient and/or patient(s) parent(s) verbalized their understanding.  Once again, discussed in detail with the patient to the level of their understanding the possibility in the future of regenerative injections versus corticosteroid injections  Patient aware of the possibility of surgical intervention in the future however at this time would like to avoid surgical intervention if possible  Discussed with patient better ways and proper technique for workouts with modifications  Reviewed RIGHT shoulder MR Arthrogram in detail with the patient and/or patients parent/legal guardian to their level of understanding; a copy of these results were provided to the patient and/or patients parent/legal guardian at the time of this office visit.  Follow up if patient decides to undergo regenerative injections for RIGHT  shoulder injury or sooner if needed.

## 2024-03-05 NOTE — PROGRESS NOTES
Physical Therapy Evaluation/Treatment    Patient Name: Juan Rodriguez  MRN: [id number]  Encounter Date: 3/22/2024  Time Calculation  Start Time: 1005  Stop Time: 1053  Time Calculation (min): 48 min    Visit Number:  1/12 (including evaluation)  Visit Authorized/insurance considerations:      $500 DED-MET, 80/20 COVERAGE, 40V PT/OT, MMO TRANS # [id number]     Progress Report due visit #10      **pt needs to complete paperwork**    Current Problem:  1. Strain of right rotator cuff capsule, subsequent encounter  Referral to Physical Therapy    Follow Up In Physical Therapy      2. Right shoulder pain, unspecified chronicity  Referral to Physical Therapy    Follow Up In Physical Therapy      3. DJD of AC (acromioclavicular) joint  Referral to Physical Therapy    Follow Up In Physical Therapy      4. Osteoarthritis of right glenohumeral joint  Referral to Physical Therapy    Follow Up In Physical Therapy      5. Tear of right supraspinatus tendon  Referral to Physical Therapy    Follow Up In Physical Therapy      6. Injury of glenoid labrum  Follow Up In Physical Therapy      7. Osteoarthritis of right glenohumeral joint  Referral to Physical Therapy    Follow Up In Physical Therapy    Mild DJD gleniod humeral joint      8. Tear of right supraspinatus tendon  Referral to Physical Therapy    Follow Up In Physical Therapy    MRI RIGHT shoulder 2024: mild articular surface fraying with mild tendinosis of the supraspinatus tendon      9. Injury of glenoid labrum, unspecified laterality, subsequent encounter  Referral to Physical Therapy    Follow Up In Physical Therapy    Superior glenoid labral tearing involving the posterior into  anterior labrum with superimposed sublabral foramen in the anterior  superior quadrant.          Subjective:  Subjective     SUBJECTIVE:  Main complaint:  years ago pt was pitching a ball to his daughter and felt a sharp pain in the back of his right arm by the shoulder.  Symptoms have not  resolved but are intermittent.  Pt chose not to have surgery at the time when they discussed a shoulder replacement after the MRI.   Onset Date:   uncertain  Date of Injury:  chronic    Patient reported hx of injury:   Pitching is daughter    Previous Medical Treatment:    Diagnostics no other treatment      Relevant PMH:  Pt reports his elbows are deformed.  His supination is limited.    Red flags:  No    Imaging:  X Ray and MRI      XR arthrogram shoulder right    Result Date: 2/23/2024  Interpreted By:  Jono Stevens, STUDY: Fluoroscopic pre-imaging arthrogram injection of the  right  shoulder dated  2/23/2024.   INDICATION: Pain.   COMPARISON: None.   ACCESSION NUMBER(S): [id number].   ORDERING CLINICIAN: DENIZ ARMSTRONG.   TECHNIQUE: Two fluoroscopic spot radiographs  right  shoulder were saved for documentation.   FINDINGS: The procedure, along with its risks, benefits, and alternatives were discussed with the patient. Verbal and written informed consent was obtained. A time-out was performed with the entire team present.   Site of access into the  right  shoulder joint was identified under fluoroscopy. The skin was marked at the chosen site of access. The skin was prepared and draped in a sterile fashion. Local and deeper anesthesia was administered using 1% lidocaine. Using fluoroscopic guidance, a 20 gauge spinal needle was advanced into the  right shoulder joint.  approximately  12 mL of a solution of dilute gadolinium base contrast, iodinated contrast, 1% lidocaine, and sterile saline were injected into the joint. Needle was removed and hemostasis was achieved. No immediate complication was evident.       1. Fluoroscopic pre-imaging  right  shoulder joint arthrogram injection. 2.  MRI to follow.   Signed by: Jono Stevens 2/23/2024 12:57 PM Dictation workstation:   [id number]    MR arthrogram shoulder right    Result Date: 2/23/2024  Interpreted By:  Jono Stevens, STUDY: MR arthrogram of the right  shoulder  without intravenous contrast date 2/23/2024.   INDICATION: Signs/Symptoms:labral tear, RTC tear, right shoulder pain   COMPARISON: None.   ACCESSION NUMBER(S): [id number]   ORDERING CLINICIAN: DENIZ ARMSTRONG   TECHNIQUE: Multiplanar multisequence MRI of the right shoulder was performed after the fluoroscopically guided instillation of dilute gadolinium contrast into the glenohumeral joint.       FINDINGS: MUSCLES AND TENDONS:   There is mild articular surface fraying of the distal to insertional supraspinatus tendon with associated mild increased intermediate signal intensity within the substance. The infraspinatus tendon is intact.  The teres minor tendon is intact.  There is some contrast interdigitating into and through the substance of the subscapularis which is felt to be iatrogenic in nature. Subscapularis tendon is otherwise intact. The tendon of the long head of the biceps is intact and is seated within the bicipital groove distally.  No significant rotator cuff muscle atrophy is evident.  No mass is evident and the suprascapular or spinoglenoid notch or the quadrangular space.   OSSEOUS STRUCTURES AND JOINTS:   There is superior glenoid labral tearing from the approximate 9:00 position in the posterosuperior quadrant to the 12:00 position. In the anterior superior quadrant there is contrast passing between the glenoid labrum and the glenoid bone stock most compatible with a sublabral foramen. The substance of the labral tissue in the anterior superior quadrant appears to be diminutive with some associated fraying in indistinctness such as seen at image 16 in the axial plane. There is  no significant degenerative change of the glenohumeral joint. There is  mild degenerative change of the acromioclavicular joint.   The acromion is  type II  with mild lateral downsloping.  The superior, middle, and inferior glenohumeral ligament complexes are intact.   No fracture or dislocation is evident. Cystic  changes are seen at the greater tubercle of the humerus. Bone marrow signal intensity is otherwise within normal limits.   SOFT TISSUES:   There is no significant volume of fluid in the subacromial subdeltoid bursa.  There is no significant volume of fluid in the subcoracoid bursa. Associated soft tissues of the shoulder are grossly unremarkable.       1. Superior glenoid labral tearing involving the posterior into anterior labrum with superimposed sublabral foramen in the anterior superior quadrant. 2. Mild articular surface fraying with mild tendinosis of the supraspinatus tendon. 3. Mild acromioclavicular joint degenerative change.       Signed by: Jono Stevens 2/23/2024 12:57 PM Dictation workstation:   [id number]      Previous Therapy Treatments:    N/A  Successful:  No      Pt stated Goal:  help heal his shoulder, get rid of pain    General:  General  Reason for Referral: right shoulder pain  General Comment: chronic    Precautions:  Precautions  Precautions Comment: Hyperflexibile    Pain:  Pain Score: 2 (Past week:  0-6)  Pain Location: Shoulder  Pain Orientation: Right  Pain Descriptors: Aching  Pain Frequency: Intermittent  Clinical Progression: Not changed    Home Living:  Home Living Comment: yes    Home type: House  Stairs: No  Lives with: Family    Vocation:    Full time employment   Job/Job tasks:  lawn maintenance, longterm,     Prior Function Per Pt/Caregiver Report:  Level of [place]: Independent with ADLs and functional transfers, Independent with homemaking with ambulation    OBJECTIVE:    Posture:  Posture Comment: slightly rounded shoulders, forward head, anterior right shoulder    ROM and Strength:    Cervical:    Strength:  WNL and AROM:  WNL    Shoulder:    Strength:  WNL    Right IR 4+/5      AROM   Shoulder R L   Flexion WNL WNL   Extension WNL WNL   Abduction WNL WNL   Internal  Rotation 70 70   External Rotation Hyperflexible hyperflexible     Elbow:    Strength:  WNL  and See below     AROM STRENGTH   Elbow R L R L   Flexion wnl wnl 4+/5 5/5   Extension WNL WNL 5/5 5/5   Supination na NA NA NA   Pronation wfl wfl 5/5 5/5       Special Tests:       Shoulder     Right Left   Comment +empty can       Palpation:  Palpation Comment: TTP:  teres minor, rhomboid, supraspinatus    Outcome Measures:      Pt chose not to complete Outcome form as he had to leave to return to work.  Told the  staff he'll fill out paperwork at his next visit.    Assessment  Assessment Comment: Pt left with a better understanding of what is    Pt is a 48 y.o. male who presents with impairments of right shoulder.  Pt would benefit from skilled physical therapy to improve flexibility, ROM, strength to reduce pain and improve the patient's current level of functioning including routine exercise program.  Pt would also benefit from proper body mechanics and ergonomic training to better manage the patient's symptoms and reduce exacerbation.      Pt's recovery time and progress/outcomes will likely be impacted by the patient's long term shoulder pain and instability.    Plan  Treatment/Interventions: Dry needling, Education/ Instruction, Electrical stimulation, Neuromuscular re-education, Manual therapy, Self care/ home management, Taping techniques, Therapeutic activities, Therapeutic exercises, Ultrasound  PT Plan: Skilled PT  PT Frequency: 2 times per week  Duration: 12 weeks  Onset Date: 03/04/24  Certification Period Start Date: 03/22/24  Certification Period End Date: 06/20/24  Number of Treatments Authorized: 12  Rehab Potential: Good  Plan of Care Agreement: Patient    OP EDUCATION:  Outpatient Education  Individual(s) Educated: Patient  Education Provided: Anatomy, Body Mechanics, Home Exercise Program, Physiology, Posture  Education Comment: See TA for specifics      Goals:  Active       PT Problem right shoulder       PT Goal 1 STG       Start:  03/22/24    Expected End:  05/06/24       1.   Improve right  shoulder strength to 5/5 at deficits  2.  Improved scapular/right shoulder stability and positioning  3.  Improve sitting posture with correct alignment of Cx region and shoulders               PT Goal 2 LTG, functional goals       Start:  03/22/24    Expected End:  06/20/24       LTG  1.  Improved scapular/right shoulder stability and positioning  2.  Improve sitting posture with correct alignment of Cx region and shoulders  3.  Pain: 0 to 1  4. Functional Assessment tool: Pt chose not to complete after the session because he had to leave to get to work. Score and goal to be added as soon as patient completes the paperwork.      FUNCTIONAL GOAL  Pt able to perform work activities and daily activities with 75% of the time         Patient Stated Goal 1       Start:  03/22/24    Expected End:  06/20/24       help heal his shoulder, get rid of pain               Treatments this date:     Pt instructed in the following posture exercises:  Cx retraction   Scapula reverse shoulder rolls  Scapular retraction    Pt works with a  and was familiar with the correct shoulder blade position and scapular retraction    Monitor pain levels, stop any exercises that increases pain level and report to therapist next visit.       Billed Treatment Times:  Therapeutic Exercise 5 min    Therapeutic Activities:    Extensive pt education on the anatomy of the shoulder in relation to his diagnosis, kinematic changes due to his elbow condition, initiated postural re-ed with sit tall posturing and how to maintain neutral shoulder position to help with is hyperflexiblity.  Educated on various surgical options for shoulder replacements for him to discuss with his doctor at the point he is ready for one.      Billed Treatment Times:  Therapeutic Activity 10 min    Plan for next visit:  manual DN, add stretches and progress scapular and RTC strengthening, modalities as needed

## 2024-03-15 ENCOUNTER — LAB (OUTPATIENT)
Dept: LAB | Facility: LAB | Age: 49
End: 2024-03-15
Payer: COMMERCIAL

## 2024-03-15 DIAGNOSIS — M79.605 PAIN IN LEFT LEG: Primary | ICD-10-CM

## 2024-03-15 LAB
ERYTHROCYTE [SEDIMENTATION RATE] IN BLOOD BY WESTERGREN METHOD: 2 MM/H (ref 0–15)
TSH SERPL DL<=0.05 MIU/L-ACNC: 1 MIU/L (ref 0.27–4.2)
VIT B12 SERPL-MCNC: 391 PG/ML (ref 211–946)

## 2024-03-15 PROCEDURE — 86320 SERUM IMMUNOELECTROPHORESIS: CPT | Performed by: PSYCHIATRY & NEUROLOGY

## 2024-03-15 PROCEDURE — 84207 ASSAY OF VITAMIN B-6: CPT

## 2024-03-15 PROCEDURE — 84443 ASSAY THYROID STIM HORMONE: CPT

## 2024-03-15 PROCEDURE — 84165 PROTEIN E-PHORESIS SERUM: CPT | Performed by: PSYCHIATRY & NEUROLOGY

## 2024-03-15 PROCEDURE — 86334 IMMUNOFIX E-PHORESIS SERUM: CPT

## 2024-03-15 PROCEDURE — 82607 VITAMIN B-12: CPT

## 2024-03-15 PROCEDURE — 84155 ASSAY OF PROTEIN SERUM: CPT

## 2024-03-15 PROCEDURE — 36415 COLL VENOUS BLD VENIPUNCTURE: CPT

## 2024-03-15 PROCEDURE — 84165 PROTEIN E-PHORESIS SERUM: CPT

## 2024-03-15 PROCEDURE — 86780 TREPONEMA PALLIDUM: CPT

## 2024-03-15 PROCEDURE — 85652 RBC SED RATE AUTOMATED: CPT

## 2024-03-16 LAB
PROT SERPL-MCNC: 7.4 G/DL (ref 6.4–8.2)
TREPONEMA PALLIDUM IGG+IGM AB [PRESENCE] IN SERUM OR PLASMA BY IMMUNOASSAY: NONREACTIVE

## 2024-03-19 LAB — PYRIDOXAL PHOS SERPL-SCNC: 34.1 NMOL/L (ref 20–125)

## 2024-03-20 LAB
ALBUMIN: 4.5 G/DL (ref 3.4–5)
ALPHA 1 GLOBULIN: 0.3 G/DL (ref 0.2–0.6)
ALPHA 2 GLOBULIN: 0.7 G/DL (ref 0.4–1.1)
BETA GLOBULIN: 0.9 G/DL (ref 0.5–1.2)
GAMMA GLOBULIN: 1.1 G/DL (ref 0.5–1.4)
IMMUNOFIXATION COMMENT: NORMAL
PATH REVIEW - SERUM IMMUNOFIXATION: NORMAL
PATH REVIEW-SERUM PROTEIN ELECTROPHORESIS: NORMAL
PROTEIN ELECTROPHORESIS COMMENT: NORMAL

## 2024-03-22 ENCOUNTER — EVALUATION (OUTPATIENT)
Dept: PHYSICAL THERAPY | Facility: CLINIC | Age: 49
End: 2024-03-22
Payer: COMMERCIAL

## 2024-03-22 DIAGNOSIS — M75.101 TEAR OF RIGHT SUPRASPINATUS TENDON: ICD-10-CM

## 2024-03-22 DIAGNOSIS — M19.019 DJD OF AC (ACROMIOCLAVICULAR) JOINT: ICD-10-CM

## 2024-03-22 DIAGNOSIS — S49.90XD INJURY OF GLENOID LABRUM, UNSPECIFIED LATERALITY, SUBSEQUENT ENCOUNTER: ICD-10-CM

## 2024-03-22 DIAGNOSIS — S46.011D STRAIN OF RIGHT ROTATOR CUFF CAPSULE, SUBSEQUENT ENCOUNTER: Primary | ICD-10-CM

## 2024-03-22 DIAGNOSIS — M19.011 OSTEOARTHRITIS OF RIGHT GLENOHUMERAL JOINT: ICD-10-CM

## 2024-03-22 DIAGNOSIS — M25.511 RIGHT SHOULDER PAIN, UNSPECIFIED CHRONICITY: ICD-10-CM

## 2024-03-22 DIAGNOSIS — S49.90XA: ICD-10-CM

## 2024-03-22 PROCEDURE — 97161 PT EVAL LOW COMPLEX 20 MIN: CPT | Mod: GP | Performed by: PHYSICAL THERAPIST

## 2024-03-22 PROCEDURE — 97530 THERAPEUTIC ACTIVITIES: CPT | Mod: GP | Performed by: PHYSICAL THERAPIST

## 2024-03-22 ASSESSMENT — PAIN SCALES - GENERAL: PAINLEVEL_OUTOF10: 2

## 2024-03-22 ASSESSMENT — PAIN DESCRIPTION - DESCRIPTORS: DESCRIPTORS: ACHING

## 2024-04-12 NOTE — PROGRESS NOTES
Physical Therapy Treatment    Patient Name: Juan Rodriguez  MRN: [id number]  Encounter date:  4/16/2024             Visit Number:  Visit count could not be calculated. Make sure you are using a visit which is associated with an episode. (including evaluation)  Planned total visits: ***  Visit Authorized/insurance coverage:  ***  Progress Report due visit #***    Visit Number:  1/12 (including evaluation)  Visit Authorized/insurance considerations:      $500 DED-MET, 80/20 COVERAGE, 40V PT/OT, MMO TRANS # [id number]      Progress Report due visit #10        **pt needs to complete paperwork**    Current Problem  Problem List Items Addressed This Visit    None       Precautions         Pain       Subjective  General            Objective  ***    Treatment:  {PT Treatments:51747}    manual DN, add stretches and progress scapular and RTC strengthening, modalities as needed     Cx retraction   Scapula reverse shoulder rolls  Scapular retraction     Pt works with a  and was familiar with the correct shoulder blade position and scapular retraction     Monitor pain levels, stop any exercises that increases pain level and report to therapist next visit.     Billed Treatment Times:  {Treatment times:66608}    Current HEP:  ***    {PT Treatments:32091}  Manual:    ***  Billed Treatment Times:  {Treatment times:81729}      {PT Treatments:07591}  Balance/NMRE:     ***  Billed Treatment Times:  {Treatment times:59620}    {PT Treatments:50165}  Therapeutic Activity:  Therapeutic Activities:     Extensive pt education on the anatomy of the shoulder in relation to his diagnosis, kinematic changes due to his elbow condition, initiated postural re-ed with sit tall posturing and how to maintain neutral shoulder position to help with is hyperflexiblity.  Educated on various surgical options for shoulder replacements for him to discuss with his doctor at the point he is ready for one.  Billed Treatment Times:  {Treatment  times:45249}    OP EDUCATION:       Assessment:     Pt's response to treatment:  ***  Areas of improvements:  ***  Limitations/deficits:  ***    Pain end of session:  ***    Plan:     {BASPLAN:48967}    Assessment of current progress against goals:  {BASPTNOTEGOALASSESSMENT:06101}    Goals:

## 2024-04-16 ENCOUNTER — APPOINTMENT (OUTPATIENT)
Dept: PHYSICAL THERAPY | Facility: CLINIC | Age: 49
End: 2024-04-16
Payer: COMMERCIAL

## 2024-04-19 ENCOUNTER — APPOINTMENT (OUTPATIENT)
Dept: PRIMARY CARE | Facility: CLINIC | Age: 49
End: 2024-04-19
Payer: COMMERCIAL

## 2024-04-23 ENCOUNTER — APPOINTMENT (OUTPATIENT)
Dept: PHYSICAL THERAPY | Facility: CLINIC | Age: 49
End: 2024-04-23
Payer: COMMERCIAL

## 2024-04-25 ENCOUNTER — APPOINTMENT (OUTPATIENT)
Dept: PHYSICAL THERAPY | Facility: CLINIC | Age: 49
End: 2024-04-25
Payer: COMMERCIAL

## 2024-04-30 ENCOUNTER — APPOINTMENT (OUTPATIENT)
Dept: PHYSICAL THERAPY | Facility: CLINIC | Age: 49
End: 2024-04-30
Payer: COMMERCIAL

## 2024-05-02 ENCOUNTER — APPOINTMENT (OUTPATIENT)
Dept: PHYSICAL THERAPY | Facility: CLINIC | Age: 49
End: 2024-05-02
Payer: COMMERCIAL

## 2024-05-06 ENCOUNTER — APPOINTMENT (OUTPATIENT)
Dept: SPORTS MEDICINE | Facility: CLINIC | Age: 49
End: 2024-05-06
Payer: COMMERCIAL

## 2024-05-07 ENCOUNTER — APPOINTMENT (OUTPATIENT)
Dept: PHYSICAL THERAPY | Facility: CLINIC | Age: 49
End: 2024-05-07
Payer: COMMERCIAL

## 2024-05-19 NOTE — PROGRESS NOTES
Physical Therapy Treatment    Patient Name: Juan Rodriguez  MRN: [id number]  Encounter date:  5/21/2024             Visit Number:  Visit count could not be calculated. Make sure you are using a visit which is associated with an episode. (including evaluation)  Planned total visits: ***  Visit Authorized/insurance coverage:  ***  Progress Report due visit #***    Visit Number:  1/12 (including evaluation)  Visit Authorized/insurance considerations:      $500 DED-MET, 80/20 COVERAGE, 40V PT/OT, MMO TRANS # [id number]      Progress Report due visit #10    **pt needs to complete paperwork**     Current Problem  Problem List Items Addressed This Visit    None       Surgery  ***    Surgery date:  ***    Date:  *** *** weeks post surgery    Precautions         Pain       Subjective  General            Objective  ***    Treatment:  {PT Treatments:03479}    manual DN, add stretches and progress scapular and RTC strengthening, modalities as needed     Pt instructed in the following posture exercises:  Cx retraction   Scapula reverse shoulder rolls  Scapular retraction     Pt works with a  and was familiar with the correct shoulder blade position and scapular retraction     Monitor pain levels, stop any exercises that increases pain level and report to therapist next visit.   Billed Treatment Times:  {Treatment times:62137}    Current HEP:  ***    {PT Treatments:79403}  Manual:    ***  Billed Treatment Times:  {Treatment times:94730}      {PT Treatments:97794}  Balance/NMRE:     ***  Billed Treatment Times:  {Treatment times:33423}    {PT Treatments:76177}  Therapeutic Activity:  ***  Billed Treatment Times:  {Treatment times:10849}    OP EDUCATION:       Assessment:     Areas of improvements:  {basassessmentimprovements:60124}  Limitations/deficits:  {basassessmentlimitations/deficits:41986}    Pain end of session:  ***    Plan:     {BASPLAN:97895}    Assessment of current progress against  goals:  {[facility]NOTEGWomen [facility] IslandASSESSMy[facility]:50992}    Goals:

## 2024-05-21 ENCOUNTER — DOCUMENTATION (OUTPATIENT)
Dept: PHYSICAL THERAPY | Facility: CLINIC | Age: 49
End: 2024-05-21
Payer: COMMERCIAL

## 2024-05-21 ENCOUNTER — APPOINTMENT (OUTPATIENT)
Dept: PHYSICAL THERAPY | Facility: CLINIC | Age: 49
End: 2024-05-21
Payer: COMMERCIAL

## 2024-05-21 NOTE — PROGRESS NOTES
Physical Therapy    Discharge Summary    Name: Juan Rodriguez  MRN: [id number]  : 1975  Date: 24    Discharge Summary: PT    Discharge Information: Date of discharge 2024    Therapy Summary: Eval only. Pt canceled remaining visits.    Discharge Status: home     Rehab Discharge Reason: Failed to schedule and/or keep follow-up appointment(s).  Pt no showed for first follow up appointment.  When called as two wether he'll be attending his next appointment, pt stated  because of his work schedule he is cancelling the rest of his appointments and is seeing his doctor  and said if he wants to return he will get a new referral from the doctor.

## 2024-05-21 NOTE — PROGRESS NOTES
Physical Therapy Treatment    Patient Name: Juan Rodriguez  MRN: [id number]  Encounter date:  5/23/2024             Visit Number:  Visit count could not be calculated. Make sure you are using a visit which is associated with an episode. (including evaluation)  Planned total visits: ***  Visit Authorized/insurance coverage:      $500 DED-MET, 80/20 COVERAGE, 40V PT/OT, MMO TRANS # [id number]     Progress Report due visit #***        Current Problem  Problem List Items Addressed This Visit    None       Precautions         Pain       Subjective  General            Objective  ***    Treatment:  {PT Treatments:29635}    manual DN, add stretches and progress scapular and RTC strengthening, modalities as needed     Pt instructed in the following posture exercises:  Cx retraction   Scapula reverse shoulder rolls  Scapular retraction     Pt works with a  and was familiar with the correct shoulder blade position and scapular retraction     Monitor pain levels, stop any exercises that increases pain level and report to therapist next visit.    Billed Treatment Times:  {Treatment times:38220}    Current HEP:  ***    {PT Treatments:49662}  Manual:    ***  Billed Treatment Times:  {Treatment times:48430}      {PT Treatments:90029}  Balance/NMRE:     ***  Billed Treatment Times:  {Treatment times:82960}    {PT Treatments:89329}  Therapeutic Activity:  ***  Billed Treatment Times:  {Treatment times:44849}    OP EDUCATION:       Assessment:     Areas of improvements:  {basassessmentimprovements:25570}  Limitations/deficits:  {basassessmentlimitations/deficits:29696}    Pain end of session:  ***    Plan:     {BASPLAN:23638}    Assessment of current progress against goals:  {BASPTNOTEGOALASSESSMENT:93738}    Goals:

## 2024-05-23 ENCOUNTER — APPOINTMENT (OUTPATIENT)
Dept: PHYSICAL THERAPY | Facility: CLINIC | Age: 49
End: 2024-05-23
Payer: COMMERCIAL

## 2024-05-28 ENCOUNTER — APPOINTMENT (OUTPATIENT)
Dept: PHYSICAL THERAPY | Facility: CLINIC | Age: 49
End: 2024-05-28
Payer: COMMERCIAL

## 2024-05-30 ENCOUNTER — APPOINTMENT (OUTPATIENT)
Dept: PHYSICAL THERAPY | Facility: CLINIC | Age: 49
End: 2024-05-30
Payer: COMMERCIAL

## 2024-06-03 ENCOUNTER — APPOINTMENT (OUTPATIENT)
Dept: SPORTS MEDICINE | Facility: CLINIC | Age: 49
End: 2024-06-03
Payer: COMMERCIAL

## 2024-06-07 ENCOUNTER — OFFICE VISIT (OUTPATIENT)
Dept: PRIMARY CARE | Facility: CLINIC | Age: 49
End: 2024-06-07
Payer: COMMERCIAL

## 2024-06-07 ENCOUNTER — HOSPITAL ENCOUNTER (OUTPATIENT)
Dept: RADIOLOGY | Facility: HOSPITAL | Age: 49
Discharge: HOME | End: 2024-06-07
Payer: COMMERCIAL

## 2024-06-07 VITALS
DIASTOLIC BLOOD PRESSURE: 80 MMHG | WEIGHT: 183.6 LBS | HEART RATE: 86 BPM | SYSTOLIC BLOOD PRESSURE: 122 MMHG | OXYGEN SATURATION: 99 % | BODY MASS INDEX: 27.11 KG/M2

## 2024-06-07 DIAGNOSIS — M54.50 ACUTE RIGHT-SIDED LOW BACK PAIN WITHOUT SCIATICA: ICD-10-CM

## 2024-06-07 DIAGNOSIS — K21.9 GASTROESOPHAGEAL REFLUX DISEASE WITHOUT ESOPHAGITIS: ICD-10-CM

## 2024-06-07 DIAGNOSIS — I83.12 VARICOSE VEINS OF BOTH LOWER EXTREMITIES WITH INFLAMMATION: ICD-10-CM

## 2024-06-07 DIAGNOSIS — E78.1 PURE HYPERTRIGLYCERIDEMIA: Primary | ICD-10-CM

## 2024-06-07 DIAGNOSIS — E11.9 TYPE 2 DIABETES MELLITUS WITHOUT COMPLICATION, WITHOUT LONG-TERM CURRENT USE OF INSULIN (MULTI): ICD-10-CM

## 2024-06-07 DIAGNOSIS — I83.11 VARICOSE VEINS OF BOTH LOWER EXTREMITIES WITH INFLAMMATION: ICD-10-CM

## 2024-06-07 LAB — POC HEMOGLOBIN A1C: 6 (ref 4.2–6.5)

## 2024-06-07 PROCEDURE — 99214 OFFICE O/P EST MOD 30 MIN: CPT

## 2024-06-07 PROCEDURE — 3074F SYST BP LT 130 MM HG: CPT

## 2024-06-07 PROCEDURE — 72110 X-RAY EXAM L-2 SPINE 4/>VWS: CPT

## 2024-06-07 PROCEDURE — 72110 X-RAY EXAM L-2 SPINE 4/>VWS: CPT | Performed by: RADIOLOGY

## 2024-06-07 PROCEDURE — 3079F DIAST BP 80-89 MM HG: CPT

## 2024-06-07 PROCEDURE — 1036F TOBACCO NON-USER: CPT

## 2024-06-07 PROCEDURE — 3062F POS MACROALBUMINURIA REV: CPT

## 2024-06-07 PROCEDURE — 3044F HG A1C LEVEL LT 7.0%: CPT

## 2024-06-07 PROCEDURE — 83036 HEMOGLOBIN GLYCOSYLATED A1C: CPT

## 2024-06-07 RX ORDER — DAPAGLIFLOZIN 10 MG/1
10 TABLET, FILM COATED ORAL EVERY 24 HOURS
COMMUNITY

## 2024-06-07 RX ORDER — TIRZEPATIDE 7.5 MG/.5ML
7.5 INJECTION, SOLUTION SUBCUTANEOUS
Qty: 2 ML | Refills: 2 | Status: SHIPPED | OUTPATIENT
Start: 2024-06-09

## 2024-06-07 RX ORDER — METFORMIN HYDROCHLORIDE 1000 MG/1
1000 TABLET ORAL
COMMUNITY

## 2024-06-07 ASSESSMENT — ENCOUNTER SYMPTOMS
OCCASIONAL FEELINGS OF UNSTEADINESS: 0
DEPRESSION: 0
LOSS OF SENSATION IN FEET: 0

## 2024-06-07 ASSESSMENT — LIFESTYLE VARIABLES
HOW OFTEN DO YOU HAVE SIX OR MORE DRINKS ON ONE OCCASION: NEVER
AUDIT-C TOTAL SCORE: 0
HOW MANY STANDARD DRINKS CONTAINING ALCOHOL DO YOU HAVE ON A TYPICAL DAY: PATIENT DOES NOT DRINK
HOW OFTEN DO YOU HAVE A DRINK CONTAINING ALCOHOL: NEVER
SKIP TO QUESTIONS 9-10: 1

## 2024-06-07 ASSESSMENT — PATIENT HEALTH QUESTIONNAIRE - PHQ9
2. FEELING DOWN, DEPRESSED OR HOPELESS: NOT AT ALL
SUM OF ALL RESPONSES TO PHQ9 QUESTIONS 1 AND 2: 0
1. LITTLE INTEREST OR PLEASURE IN DOING THINGS: NOT AT ALL

## 2024-06-07 ASSESSMENT — PAIN SCALES - GENERAL: PAINLEVEL: 2

## 2024-06-07 NOTE — PATIENT INSTRUCTIONS
Continue current medication.  Continue work on diet - recommend lots of fruits and vegetables, lean protein like chicken, turkey, fish, beans and Greek yogurt. Try to choose healthier carbohydrate options like oatmeal, wheat bread and pasta, sweet potatoes. Limit sugary treats.  Check a fasting sugar first thing in the AM once daily and keep a log of the results to bring to your next office visit.  Please contact office if your sugars are consistently >140.  Reevaluate in 3 months.   GERD  Lifestyle interventions:  Avoiding foods that may be irritating such as chocolate, caffeine, alcohol, acid/spicy foods, carbonated beverages, and fatty foods.  Avoiding eating within 3 hours of bedtime. May try raising the head of bed at night.  Try to eat 3-5 smaller meals per day.   Weight loss.  Smoking/nicotine cessation.     HYPERLIPIDEMIA:  Decrease intake of saturated fats, fast food, sweets.  Increase intake of fresh fruit fresh vegetables and lean meats.  Increase healthy fats seeds, nuts, olive oil instead of butter.  walk 150 minutes/week for heart health.    Varicose Veins:  I have placed a referral to vascular surgery  for further management.

## 2024-06-07 NOTE — PROGRESS NOTES
Subjective   Patient ID: Juan Rodriguez is a 48 y.o. male who presents for     HPI   Patient denies any falls,  ER, hospitalization, new diagnoses, surgeries in the past year.  Denies any issues with chest pain, chest pressure, shortness of breath, constipation, diarrhea, blood in stool, urinary urgency, frequency, blood in urine, muscle weakness in arms and legs, numbness or tingling in fingers or toes.  Fasting blood sugar have been  running 130s . He notes he has not been taking his Farxiga or metformin for the past 6 months if he has a choice he would rather go back on Farxiga.  At this time he would like to go back up to 7.5 Mounjaro given his increase of A1c we will do that at this time.  Go to urgent care for or an infection in his eye.  He does admit to increasing leg pains and bulging of veins as the days goes on.  He admits to medial bilateral groin pain with sitting for prolonged periods such as driving.  He does not know if there is any correlation with his leg veins  He has been having right-sided low back pain with exercise denies sciatic.  He notes it just feels tight will obtain x-ray to rule out bone relation  Review of Systems  Review of Systems negative except as noted in HPI and Chief complaint.    Current Outpatient Medications:     albuterol 108 (90 Base) MCG/ACT inhaler, Inhale 1 puff every 4 hours., Disp: , Rfl:     dapagliflozin propanediol (Farxiga) 10 mg, Take 1 tablet (10 mg) by mouth once every 24 hours., Disp: , Rfl:     ibuprofen 400 mg tablet, Take 1 tablet (400 mg) by mouth every 6 hours if needed., Disp: , Rfl:     metFORMIN (Glucophage) 1,000 mg tablet, Take 1 tablet (1,000 mg) by mouth 2 times daily (morning and late afternoon)., Disp: , Rfl:     OneTouch Ultra2 Meter misc, Inject 1 each under the skin once daily., Disp: , Rfl:     pantoprazole (ProtoNix) 40 mg EC tablet, Take 1 tablet (40 mg) by mouth once daily., Disp: , Rfl:     tirzepatide (Mounjaro) 5 mg/0.5 mL pen  injector, AS DIRECTED SUBCUTANEOUS WEEKLY 30 DAYS, Disp: 2 mL, Rfl: 3    [START ON 6/9/2024] tirzepatide (Mounjaro) 7.5 mg/0.5 mL pen injector, Inject 7.5 mg under the skin 1 (one) time per week., Disp: 2 mL, Rfl: 2    Objective   /80   Pulse 86   Wt 83.3 kg (183 lb 9.6 oz)   SpO2 99%   BMI 27.11 kg/m²     Physical Exam  Vitals reviewed.   Constitutional:       General: He is not in acute distress.     Appearance: Normal appearance.   HENT:      Head: Normocephalic.      Right Ear: Tympanic membrane normal.      Left Ear: Tympanic membrane normal.      Nose: Nose normal.      Mouth/Throat:      Mouth: Mucous membranes are moist.      Pharynx: Oropharynx is clear.   Eyes:      Extraocular Movements: Extraocular movements intact.      Conjunctiva/sclera: Conjunctivae normal.      Pupils: Pupils are equal, round, and reactive to light.   Cardiovascular:      Rate and Rhythm: Normal rate.      Pulses: Normal pulses.      Heart sounds: Normal heart sounds.   Pulmonary:      Effort: Pulmonary effort is normal.      Breath sounds: Normal breath sounds.   Abdominal:      General: Abdomen is flat. Bowel sounds are normal.      Palpations: Abdomen is soft.   Musculoskeletal:         General: Normal range of motion.   Skin:     General: Skin is warm and dry.      Capillary Refill: Capillary refill takes 2 to 3 seconds.   Neurological:      General: No focal deficit present.      Mental Status: He is alert and oriented to person, place, and time. Mental status is at baseline.   Psychiatric:         Mood and Affect: Mood normal.         Behavior: Behavior is cooperative.       Assessment/Plan   Diagnoses and all orders for this visit:  Pure hypertriglyceridemia  Type 2 diabetes mellitus without complication, without long-term current use of insulin (Multi)  -     POCT glycosylated hemoglobin (Hb A1C) manually resulted  -     tirzepatide (Mounjaro) 7.5 mg/0.5 mL pen injector; Inject 7.5 mg under the skin 1 (one) time  per week.  Gastroesophageal reflux disease without esophagitis  Varicose veins of both lower extremities with inflammation  -     Referral to Vascular Surgery; Future  Acute right-sided low back pain without sciatica  -     XR lumbar spine 2-3 views; Future    DM2  Time Spent  Prep time on day of patient encounter: 5 minutes  Time spent directly with patient, family or caregiver: 15 minutes  Documentation Time: 5 minutes    Continue current medication.  Continue work on diet - recommend lots of fruits and vegetables, lean protein like chicken, turkey, fish, beans and Greek yogurt. Try to choose healthier carbohydrate options like oatmeal, wheat bread and pasta, sweet potatoes. Limit sugary treats.  Check a fasting sugar first thing in the AM once daily and keep a log of the results to bring to your next office visit.  Please contact office if your sugars are consistently >140.  Reevaluate in 3 months.   GERD  Lifestyle interventions:  Avoiding foods that may be irritating such as chocolate, caffeine, alcohol, acid/spicy foods, carbonated beverages, and fatty foods.  Avoiding eating within 3 hours of bedtime. May try raising the head of bed at night.  Try to eat 3-5 smaller meals per day.   Weight loss.  Smoking/nicotine cessation.     HYPERLIPIDEMIA:  Decrease intake of saturated fats, fast food, sweets.  Increase intake of fresh fruit fresh vegetables and lean meats.  Increase healthy fats seeds, nuts, olive oil instead of butter.  walk 150 minutes/week for heart health.    Varicose Veins:  I have placed a referral to vascular surgery  for further management.   *This note was dictated using DRAGON speech recognition software and was corrected for spelling or grammatical errors, but despite proofreading several typographical errors might be present that might affect the meaning of the content.*  Arabella Beltran, CNP

## 2024-06-10 DIAGNOSIS — M51.36 DEGENERATIVE DISC DISEASE, LUMBAR: ICD-10-CM

## 2024-06-10 DIAGNOSIS — M25.70 OSTEOPHYTOSIS: Primary | ICD-10-CM

## 2024-07-01 ENCOUNTER — APPOINTMENT (OUTPATIENT)
Dept: SPORTS MEDICINE | Facility: CLINIC | Age: 49
End: 2024-07-01
Payer: COMMERCIAL

## 2024-09-13 ENCOUNTER — OFFICE VISIT (OUTPATIENT)
Dept: PRIMARY CARE | Facility: CLINIC | Age: 49
End: 2024-09-13
Payer: COMMERCIAL

## 2024-09-13 VITALS
BODY MASS INDEX: 25.05 KG/M2 | DIASTOLIC BLOOD PRESSURE: 80 MMHG | SYSTOLIC BLOOD PRESSURE: 104 MMHG | WEIGHT: 169.6 LBS | OXYGEN SATURATION: 98 % | HEART RATE: 66 BPM

## 2024-09-13 DIAGNOSIS — K21.9 GASTROESOPHAGEAL REFLUX DISEASE WITHOUT ESOPHAGITIS: ICD-10-CM

## 2024-09-13 DIAGNOSIS — E11.9 TYPE 2 DIABETES MELLITUS WITHOUT COMPLICATION, WITHOUT LONG-TERM CURRENT USE OF INSULIN (MULTI): Primary | ICD-10-CM

## 2024-09-13 DIAGNOSIS — G62.9 NEUROPATHY: ICD-10-CM

## 2024-09-13 DIAGNOSIS — E78.1 PURE HYPERTRIGLYCERIDEMIA: ICD-10-CM

## 2024-09-13 DIAGNOSIS — L65.9 HAIR LOSS: ICD-10-CM

## 2024-09-13 LAB — POC HEMOGLOBIN A1C: 5.6 (ref 4.2–6.5)

## 2024-09-13 PROCEDURE — 99214 OFFICE O/P EST MOD 30 MIN: CPT

## 2024-09-13 PROCEDURE — 3062F POS MACROALBUMINURIA REV: CPT

## 2024-09-13 PROCEDURE — 83036 HEMOGLOBIN GLYCOSYLATED A1C: CPT

## 2024-09-13 PROCEDURE — 3044F HG A1C LEVEL LT 7.0%: CPT

## 2024-09-13 PROCEDURE — 1036F TOBACCO NON-USER: CPT

## 2024-09-13 PROCEDURE — 3079F DIAST BP 80-89 MM HG: CPT

## 2024-09-13 PROCEDURE — 3074F SYST BP LT 130 MM HG: CPT

## 2024-09-13 RX ORDER — TIRZEPATIDE 7.5 MG/.5ML
7.5 INJECTION, SOLUTION SUBCUTANEOUS
Qty: 6 ML | Refills: 1 | Status: SHIPPED | OUTPATIENT
Start: 2024-09-15

## 2024-09-13 ASSESSMENT — LIFESTYLE VARIABLES
SKIP TO QUESTIONS 9-10: 1
HOW OFTEN DO YOU HAVE A DRINK CONTAINING ALCOHOL: NEVER
AUDIT-C TOTAL SCORE: 0
HOW MANY STANDARD DRINKS CONTAINING ALCOHOL DO YOU HAVE ON A TYPICAL DAY: PATIENT DOES NOT DRINK
HOW OFTEN DO YOU HAVE SIX OR MORE DRINKS ON ONE OCCASION: NEVER

## 2024-09-13 ASSESSMENT — PAIN SCALES - GENERAL: PAINLEVEL: 0-NO PAIN

## 2024-09-13 ASSESSMENT — PATIENT HEALTH QUESTIONNAIRE - PHQ9
SUM OF ALL RESPONSES TO PHQ9 QUESTIONS 1 AND 2: 0
1. LITTLE INTEREST OR PLEASURE IN DOING THINGS: NOT AT ALL
2. FEELING DOWN, DEPRESSED OR HOPELESS: NOT AT ALL

## 2024-09-13 ASSESSMENT — ENCOUNTER SYMPTOMS
DEPRESSION: 0
OCCASIONAL FEELINGS OF UNSTEADINESS: 0
LOSS OF SENSATION IN FEET: 0

## 2024-09-13 NOTE — PROGRESS NOTES
Subjective   Patient ID: Juan Rodriguez is a 49 y.o. male who presents for Follow-up.    HPI   Patient denies any falls, urgent care, ER, hospitalization, new diagnoses, surgeries in the past year.  Denies any issues with chest pain, chest pressure, shortness of breath, constipation, diarrhea, blood in stool, urinary urgency, frequency, blood in urine, muscle weakness in arms and legs, numbness or tingling in or toes.  Monitors Blood sugars at home intermittently. Generally  Denies sx of highs and lows. Continues t oeat a well balanced diet with lots of fruits (strawberries and blueberries)  He is only taking Mounjaro 7.5mg has not been taking farxiga or metformin.    Continues to have numbness bilateral finger tips. At this time deferred further work up and or treatment with gabapentin.   Admits to hair loss-   Review of Systems  Review of Systems negative except as noted in HPI and Chief complaint.    Current Outpatient Medications:     albuterol 108 (90 Base) MCG/ACT inhaler, Inhale 1 puff every 4 hours., Disp: , Rfl:     ibuprofen 400 mg tablet, Take 1 tablet (400 mg) by mouth every 6 hours if needed., Disp: , Rfl:     OneTouch Ultra2 Meter misc, Inject 1 each under the skin once daily., Disp: , Rfl:     pantoprazole (ProtoNix) 40 mg EC tablet, Take 1 tablet (40 mg) by mouth once daily., Disp: , Rfl:     [START ON 9/15/2024] tirzepatide (Mounjaro) 7.5 mg/0.5 mL pen injector, Inject 7.5 mg under the skin 1 (one) time per week., Disp: 6 mL, Rfl: 1    Objective   /80   Pulse 66   Wt 76.9 kg (169 lb 9.6 oz)   SpO2 98%   BMI 25.05 kg/m²     Physical Exam  Vitals reviewed.   Cardiovascular:      Rate and Rhythm: Normal rate.   Musculoskeletal:         General: Normal range of motion.      Cervical back: Normal range of motion.   Neurological:      General: No focal deficit present.      Mental Status: He is alert. Mental status is at baseline.   Psychiatric:         Mood and Affect: Mood normal.        Assessment/Plan   Diagnoses and all orders for this visit:  Type 2 diabetes mellitus without complication, without long-term current use of insulin (Multi)  -     POCT glycosylated hemoglobin (Hb A1C) manually resulted  -     tirzepatide (Mounjaro) 7.5 mg/0.5 mL pen injector; Inject 7.5 mg under the skin 1 (one) time per week.  Pure hypertriglyceridemia  Gastroesophageal reflux disease without esophagitis  Hair loss  Neuropathy    Diabetes Type 2  A1C is now 5.6 from 6.0   Continue current medication.  Continue work on diet - recommend lots of fruits and vegetables, lean protein like chicken, turkey, fish, beans and Greek yogurt. Try to choose healthier carbohydrate options like oatmeal, wheat bread and pasta, sweet potatoes. Limit sugary treats.  Check a fasting sugar first thing in the AM once daily and keep a log of the results to bring to your next office visit.  Please contact office if your sugars are consistently >140.  Reevaluate in 3 months.   GERD  Lifestyle interventions:  Avoiding foods that may be irritating such as chocolate, caffeine, alcohol, acid/spicy foods, carbonated beverages, and fatty foods.  Avoiding eating within 3 hours of bedtime. May try raising the head of bed at night.  Try to eat 3-5 smaller meals per day.   Weight loss.  Smoking/nicotine cessation.     HYPERLIPIDEMIA:  Your cholesterol level is elevated. Decrease intake of saturated fats, fast food, sweets.  Increase intake of fresh fruit fresh vegetables and lean meats.  Increase healthy fats seeds, nuts, olive oil instead of butter.  walk 150 minutes/week for heart health.   Aim for 25-30 grams of fiber in your diet daily.  May consider adding Fish Oil supplement 1,200 mg per day or Omega 3 Supplement daily.    Please follow up in 6 months and we will recheck.  Please reach out with any questions.   *This note was dictated using DRAGON speech recognition software and was corrected for spelling or grammatical errors, but  despite proofreading several typographical errors might be present that might affect the meaning of the content.*  Arabella Beltran, CNP

## 2024-09-13 NOTE — PATIENT INSTRUCTIONS
Diabetes Type 2  A1C is now 5.6 from 6.0   Continue current medication.  Continue work on diet - recommend lots of fruits and vegetables, lean protein like chicken, turkey, fish, beans and Greek yogurt. Try to choose healthier carbohydrate options like oatmeal, wheat bread and pasta, sweet potatoes. Limit sugary treats.  Check a fasting sugar first thing in the AM once daily and keep a log of the results to bring to your next office visit.  Please contact office if your sugars are consistently >140.  Reevaluate in 3 months.   GERD  Lifestyle interventions:  Avoiding foods that may be irritating such as chocolate, caffeine, alcohol, acid/spicy foods, carbonated beverages, and fatty foods.  Avoiding eating within 3 hours of bedtime. May try raising the head of bed at night.  Try to eat 3-5 smaller meals per day.   Weight loss.  Smoking/nicotine cessation.     HYPERLIPIDEMIA:  Your cholesterol level is elevated. Decrease intake of saturated fats, fast food, sweets.  Increase intake of fresh fruit fresh vegetables and lean meats.  Increase healthy fats seeds, nuts, olive oil instead of butter.  walk 150 minutes/week for heart health.   Aim for 25-30 grams of fiber in your diet daily.  May consider adding Fish Oil supplement 1,200 mg per day or Omega 3 Supplement daily.    Please follow up in 6 months and we will recheck.  Please reach out with any questions.

## 2024-12-13 ENCOUNTER — APPOINTMENT (OUTPATIENT)
Dept: PRIMARY CARE | Facility: CLINIC | Age: 49
End: 2024-12-13
Payer: COMMERCIAL

## 2024-12-17 ENCOUNTER — TELEPHONE (OUTPATIENT)
Dept: PRIMARY CARE | Facility: CLINIC | Age: 49
End: 2024-12-17
Payer: COMMERCIAL

## 2024-12-17 DIAGNOSIS — E11.9 TYPE 2 DIABETES MELLITUS WITHOUT COMPLICATION, WITHOUT LONG-TERM CURRENT USE OF INSULIN (MULTI): ICD-10-CM

## 2024-12-17 RX ORDER — TIRZEPATIDE 7.5 MG/.5ML
7.5 INJECTION, SOLUTION SUBCUTANEOUS
Qty: 6 ML | Refills: 0 | Status: SHIPPED | OUTPATIENT
Start: 2024-12-17

## 2024-12-17 NOTE — TELEPHONE ENCOUNTER
Patient's insurance denied Mounjaro due to not meeting the criteria of A1C greater than or equal to 6.5 or fasting glucose great than or equal to 126 or 2 hour plasma glucose of 200. He is wondering if there is anything that we can do to get this approved. I am going to start an appeal. Will place in your in-box for review and signing.

## 2025-01-17 ENCOUNTER — APPOINTMENT (OUTPATIENT)
Dept: PRIMARY CARE | Facility: CLINIC | Age: 50
End: 2025-01-17
Payer: COMMERCIAL

## 2025-02-14 ENCOUNTER — APPOINTMENT (OUTPATIENT)
Dept: PRIMARY CARE | Facility: CLINIC | Age: 50
End: 2025-02-14
Payer: COMMERCIAL

## 2025-03-07 ENCOUNTER — OFFICE VISIT (OUTPATIENT)
Dept: PRIMARY CARE | Facility: CLINIC | Age: 50
End: 2025-03-07
Payer: COMMERCIAL

## 2025-03-07 VITALS
DIASTOLIC BLOOD PRESSURE: 83 MMHG | OXYGEN SATURATION: 98 % | SYSTOLIC BLOOD PRESSURE: 130 MMHG | HEART RATE: 66 BPM | RESPIRATION RATE: 16 BRPM | WEIGHT: 180.4 LBS | BODY MASS INDEX: 26.72 KG/M2 | HEIGHT: 69 IN

## 2025-03-07 DIAGNOSIS — E55.9 VITAMIN D DEFICIENCY DISEASE: ICD-10-CM

## 2025-03-07 DIAGNOSIS — L65.9 HAIR LOSS DISORDER: ICD-10-CM

## 2025-03-07 DIAGNOSIS — Z00.00 ENCOUNTER FOR ANNUAL PHYSICAL EXAMINATION EXCLUDING GYNECOLOGICAL EXAMINATION IN A PATIENT OLDER THAN 17 YEARS: ICD-10-CM

## 2025-03-07 DIAGNOSIS — K21.9 GASTROESOPHAGEAL REFLUX DISEASE WITHOUT ESOPHAGITIS: ICD-10-CM

## 2025-03-07 DIAGNOSIS — I83.11 VARICOSE VEINS OF BOTH LOWER EXTREMITIES WITH INFLAMMATION: ICD-10-CM

## 2025-03-07 DIAGNOSIS — E78.1 PURE HYPERTRIGLYCERIDEMIA: Primary | ICD-10-CM

## 2025-03-07 DIAGNOSIS — R20.9 COLD FEET: ICD-10-CM

## 2025-03-07 DIAGNOSIS — G62.9 NEUROPATHY: ICD-10-CM

## 2025-03-07 DIAGNOSIS — E11.9 TYPE 2 DIABETES MELLITUS WITHOUT COMPLICATION, WITHOUT LONG-TERM CURRENT USE OF INSULIN (MULTI): ICD-10-CM

## 2025-03-07 DIAGNOSIS — Z13.29 SCREENING FOR THYROID DISORDER: ICD-10-CM

## 2025-03-07 DIAGNOSIS — E29.1 PRIMARY HYPOGONADISM IN MALE: ICD-10-CM

## 2025-03-07 DIAGNOSIS — I83.12 VARICOSE VEINS OF BOTH LOWER EXTREMITIES WITH INFLAMMATION: ICD-10-CM

## 2025-03-07 LAB — POC HEMOGLOBIN A1C: 5.7 % (ref 4.2–6.5)

## 2025-03-07 PROCEDURE — 1036F TOBACCO NON-USER: CPT

## 2025-03-07 PROCEDURE — 99396 PREV VISIT EST AGE 40-64: CPT

## 2025-03-07 PROCEDURE — 3079F DIAST BP 80-89 MM HG: CPT

## 2025-03-07 PROCEDURE — 3008F BODY MASS INDEX DOCD: CPT

## 2025-03-07 PROCEDURE — 83036 HEMOGLOBIN GLYCOSYLATED A1C: CPT

## 2025-03-07 PROCEDURE — 3075F SYST BP GE 130 - 139MM HG: CPT

## 2025-03-07 RX ORDER — PANTOPRAZOLE SODIUM 40 MG/1
40 TABLET, DELAYED RELEASE ORAL DAILY
Qty: 90 TABLET | Refills: 1 | Status: SHIPPED | OUTPATIENT
Start: 2025-03-07

## 2025-03-07 RX ORDER — TIRZEPATIDE 7.5 MG/.5ML
7.5 INJECTION, SOLUTION SUBCUTANEOUS
Qty: 6 ML | Refills: 1 | Status: SHIPPED | OUTPATIENT
Start: 2025-03-09

## 2025-03-07 ASSESSMENT — COLUMBIA-SUICIDE SEVERITY RATING SCALE - C-SSRS
6. HAVE YOU EVER DONE ANYTHING, STARTED TO DO ANYTHING, OR PREPARED TO DO ANYTHING TO END YOUR LIFE?: NO
1. IN THE PAST MONTH, HAVE YOU WISHED YOU WERE DEAD OR WISHED YOU COULD GO TO SLEEP AND NOT WAKE UP?: NO
2. HAVE YOU ACTUALLY HAD ANY THOUGHTS OF KILLING YOURSELF?: NO

## 2025-03-07 ASSESSMENT — PATIENT HEALTH QUESTIONNAIRE - PHQ9
2. FEELING DOWN, DEPRESSED OR HOPELESS: NOT AT ALL
1. LITTLE INTEREST OR PLEASURE IN DOING THINGS: NOT AT ALL
SUM OF ALL RESPONSES TO PHQ9 QUESTIONS 1 AND 2: 0

## 2025-03-07 ASSESSMENT — ENCOUNTER SYMPTOMS
DEPRESSION: 0
OCCASIONAL FEELINGS OF UNSTEADINESS: 0
LOSS OF SENSATION IN FEET: 0

## 2025-03-07 ASSESSMENT — PAIN SCALES - GENERAL: PAINLEVEL_OUTOF10: 2

## 2025-03-07 NOTE — PATIENT INSTRUCTIONS
Assessment/Plan   Assessment & Plan  Type 2 diabetes mellitus without complication, without long-term current use of insulin (Multi)    Orders:    POCT glycosylated hemoglobin (Hb A1C) manually resulted    tirzepatide (Mounjaro) 7.5 mg/0.5 mL pen injector; Inject 7.5 mg under the skin 1 (one) time per week.    Albumin-Creatinine Ratio, Urine Random; Future  Diabetes Type 2  A1C is now 5.7 from 5.6   Continue current medication.  Continue work on diet - recommend lots of fruits and vegetables, lean protein like chicken, turkey, fish, beans and Greek yogurt. Try to choose healthier carbohydrate options like oatmeal, wheat bread and pasta, sweet potatoes. Limit sugary treats.  Check a fasting sugar first thing in the AM once daily and keep a log of the results to bring to your next office visit.  Please contact office if your sugars are consistently >140.  Reevaluate in 3 months.     Pure hypertriglyceridemia    Orders:    Lipid Panel; Future  Decrease intake of saturated fats, fast food, sweets.  Increase intake of fresh fruit fresh vegetables and lean meats.  Increase healthy fats seeds, nuts, olive oil instead of butter.  walk 150 minutes/week for heart health.   Aim for 25-30 grams of fiber in your diet daily.  May consider adding Fish Oil supplement 1,200 mg per day or Omega 3 Supplement daily.         Vitamin D deficiency disease    Orders:    Vitamin D 25-Hydroxy,Total (for eval of Vitamin D levels); Future    Primary hypogonadism in male    Orders:    Testosterone, total and free; Future    Gastroesophageal reflux disease without esophagitis    Orders:    pantoprazole (ProtoNix) 40 mg EC tablet; Take 1 tablet (40 mg) by mouth once daily.  Lifestyle interventions:  Avoiding foods that may be irritating such as chocolate, caffeine, alcohol, acid/spicy foods, carbonated beverages, and fatty foods.  Avoiding eating within 3 hours of bedtime. May try raising the head of bed at night.  Try to eat 3-5 smaller meals  per day.   Weight loss.  Smoking/nicotine cessation.     Encounter for annual physical examination excluding gynecological examination in a patient older than 17 years    Orders:    Comprehensive Metabolic Panel; Future    CBC and Auto Differential; Future  LAB Order/ BLOOD TESTS   I have ordered lab work for you to get done. This should be fasting. Nothing to eat or drink after midnight besides black tea,  black coffee, or water. If you do not hear from this office within two days of having your labs done, please call for your results.   Please call to schedule an appointment:   [email or web address] Scheduling phone number is [phone]  You can also schedule an appointment online by logging into   [employer]    Screening for thyroid disorder    Orders:    TSH with reflex to Free T4 if abnormal; Future    Hair loss disorder    Orders:    Referral to Dermatology  referral placed to derm for further management

## 2025-03-07 NOTE — PROGRESS NOTES
"Subjective   Patient ID: Juan Rodriguez is a 49 y.o. male who presents for annual physical     HPI   Patient denies any falls, urgent care, ER, hospitalization, new diagnoses, surgeries since they were here last.  Denies any issues with chest pain, chest pressure, constipation, diarrhea, blood in stool, urinary urgency, frequency, blood in urine, muscle weakness in arms and legs, numbness or tingling in fingers or toes.  DM  Intermittent fasting blood sugars up to 120.    Complaint on mounjaro  Injection day is Friday   Admits to nausea intermittently denies constipation    Intermittent SOB   Denies smoking, vaping, marijuana     Colonoscopy   UTD 8/18/2022 recall  10  years    Right middle toe intermittently shooting pain . \"Electric\" feeling   HX of varicose veins     Increased hair loss  He has tried over-the-counter Luda filler shampoo x 3 months with no improvement  He has been to Derm in the past advise injections patient declined would like a second opinion    Review of Systems  Review of Systems negative except as noted in HPI and Chief complaint.    Current Outpatient Medications:     ibuprofen 400 mg tablet, Take 1 tablet (400 mg) by mouth every 6 hours if needed., Disp: , Rfl:     OneTouch Ultra2 Meter misc, Inject 1 each under the skin once daily., Disp: , Rfl:     pantoprazole (ProtoNix) 40 mg EC tablet, Take 1 tablet (40 mg) by mouth once daily., Disp: 90 tablet, Rfl: 1    [START ON 3/9/2025] tirzepatide (Mounjaro) 7.5 mg/0.5 mL pen injector, Inject 7.5 mg under the skin 1 (one) time per week., Disp: 6 mL, Rfl: 1    Objective   /83 (BP Location: Left arm, Patient Position: Sitting, BP Cuff Size: Adult)   Pulse 66   Resp 16   Ht 1.753 m (5' 9\")   Wt 81.8 kg (180 lb 6.4 oz)   SpO2 98%   BMI 26.64 kg/m²     Physical Exam  Vitals reviewed.   Constitutional:       General: He is not in acute distress.     Appearance: Normal appearance.   HENT:      Head: Normocephalic.      Right Ear: " Tympanic membrane normal.      Left Ear: Tympanic membrane normal.      Nose: Nose normal.      Mouth/Throat:      Mouth: Mucous membranes are moist.      Pharynx: Oropharynx is clear.   Eyes:      Extraocular Movements: Extraocular movements intact.      Conjunctiva/sclera: Conjunctivae normal.      Pupils: Pupils are equal, round, and reactive to light.   Cardiovascular:      Rate and Rhythm: Normal rate.      Pulses: Normal pulses.      Heart sounds: Normal heart sounds.   Pulmonary:      Effort: Pulmonary effort is normal.      Breath sounds: Normal breath sounds.   Abdominal:      General: Abdomen is flat. Bowel sounds are normal.      Palpations: Abdomen is soft.   Musculoskeletal:         General: Normal range of motion.   Skin:     General: Skin is warm and dry.      Capillary Refill: Capillary refill takes 2 to 3 seconds.   Neurological:      General: No focal deficit present.      Mental Status: He is alert and oriented to person, place, and time. Mental status is at baseline.   Psychiatric:         Mood and Affect: Mood normal.         Behavior: Behavior is cooperative.         Assessment/Plan   Assessment & Plan  Type 2 diabetes mellitus without complication, without long-term current use of insulin (Multi)    Orders:    POCT glycosylated hemoglobin (Hb A1C) manually resulted    tirzepatide (Mounjaro) 7.5 mg/0.5 mL pen injector; Inject 7.5 mg under the skin 1 (one) time per week.    Albumin-Creatinine Ratio, Urine Random; Future  Diabetes Type 2  A1C is now 5.7 from 5.6   Continue current medication.  Continue work on diet - recommend lots of fruits and vegetables, lean protein like chicken, turkey, fish, beans and Greek yogurt. Try to choose healthier carbohydrate options like oatmeal, wheat bread and pasta, sweet potatoes. Limit sugary treats.  Check a fasting sugar first thing in the AM once daily and keep a log of the results to bring to your next office visit.  Please contact office if your sugars  are consistently >140.  Reevaluate in 3 months.     Pure hypertriglyceridemia    Orders:    Lipid Panel; Future  Decrease intake of saturated fats, fast food, sweets.  Increase intake of fresh fruit fresh vegetables and lean meats.  Increase healthy fats seeds, nuts, olive oil instead of butter.  walk 150 minutes/week for heart health.   Aim for 25-30 grams of fiber in your diet daily.  May consider adding Fish Oil supplement 1,200 mg per day or Omega 3 Supplement daily.         Vitamin D deficiency disease    Orders:    Vitamin D 25-Hydroxy,Total (for eval of Vitamin D levels); Future    Primary hypogonadism in male    Orders:    Testosterone, total and free; Future    Gastroesophageal reflux disease without esophagitis    Orders:    pantoprazole (ProtoNix) 40 mg EC tablet; Take 1 tablet (40 mg) by mouth once daily.  Lifestyle interventions:  Avoiding foods that may be irritating such as chocolate, caffeine, alcohol, acid/spicy foods, carbonated beverages, and fatty foods.  Avoiding eating within 3 hours of bedtime. May try raising the head of bed at night.  Try to eat 3-5 smaller meals per day.   Weight loss.  Smoking/nicotine cessation.     Encounter for annual physical examination excluding gynecological examination in a patient older than 17 years    Orders:    Comprehensive Metabolic Panel; Future    CBC and Auto Differential; Future  LAB Order/ BLOOD TESTS   I have ordered lab work for you to get done. This should be fasting. Nothing to eat or drink after midnight besides black tea,  black coffee, or water. If you do not hear from this office within two days of having your labs done, please call for your results.   Please call to schedule an appointment:   [facility] Scheduling phone number is [phone]  You can also schedule an appointment online by logging into   [employer]    Screening for thyroid disorder    Orders:    TSH with reflex to Free T4 if abnormal; Future    Hair loss  disorder    Orders:    Referral to Dermatology  referral placed to derm for further management   Cold feet    Orders:    Referral to Vascular Surgery; Future  Referral to vascular surgery for further management  Neuropathy    Orders:    Referral to Vascular Surgery; Future  Referral to vascular surgery for further management please call to make an appointment  Varicose veins of both lower extremities with inflammation    Orders:    Referral to Vascular Surgery; Future  Referral to vascular surgery for further management  This note was dictated using DRAGON speech recognition software and was corrected for spelling or grammatical errors, but despite proofreading several typographical errors might be present that might affect the meaning of the content.  Arabella Beltran, CNP

## 2025-03-07 NOTE — ASSESSMENT & PLAN NOTE
Orders:    POCT glycosylated hemoglobin (Hb A1C) manually resulted    tirzepatide (Mounjaro) 7.5 mg/0.5 mL pen injector; Inject 7.5 mg under the skin 1 (one) time per week.    Albumin-Creatinine Ratio, Urine Random; Future  Diabetes Type 2  A1C is now 5.7 from 5.6   Continue current medication.  Continue work on diet - recommend lots of fruits and vegetables, lean protein like chicken, turkey, fish, beans and Greek yogurt. Try to choose healthier carbohydrate options like oatmeal, wheat bread and pasta, sweet potatoes. Limit sugary treats.  Check a fasting sugar first thing in the AM once daily and keep a log of the results to bring to your next office visit.  Please contact office if your sugars are consistently >140.  Reevaluate in 3 months.

## 2025-03-07 NOTE — ASSESSMENT & PLAN NOTE
Orders:    pantoprazole (ProtoNix) 40 mg EC tablet; Take 1 tablet (40 mg) by mouth once daily.  Lifestyle interventions:  Avoiding foods that may be irritating such as chocolate, caffeine, alcohol, acid/spicy foods, carbonated beverages, and fatty foods.  Avoiding eating within 3 hours of bedtime. May try raising the head of bed at night.  Try to eat 3-5 smaller meals per day.   Weight loss.  Smoking/nicotine cessation.

## 2025-03-07 NOTE — ASSESSMENT & PLAN NOTE
Orders:    Lipid Panel; Future  Decrease intake of saturated fats, fast food, sweets.  Increase intake of fresh fruit fresh vegetables and lean meats.  Increase healthy fats seeds, nuts, olive oil instead of butter.  walk 150 minutes/week for heart health.   Aim for 25-30 grams of fiber in your diet daily.  May consider adding Fish Oil supplement 1,200 mg per day or Omega 3 Supplement daily.

## 2025-03-07 NOTE — ASSESSMENT & PLAN NOTE
Orders:    Referral to Vascular Surgery; Future  Referral to vascular surgery for further management please call to make an appointment

## 2025-03-07 NOTE — ASSESSMENT & PLAN NOTE
Orders:    Referral to Vascular Surgery; Future  Referral to vascular surgery for further management

## 2025-06-30 DIAGNOSIS — E11.9 TYPE 2 DIABETES MELLITUS WITHOUT COMPLICATION, WITHOUT LONG-TERM CURRENT USE OF INSULIN: ICD-10-CM

## 2025-06-30 RX ORDER — TIRZEPATIDE 7.5 MG/.5ML
7.5 INJECTION, SOLUTION SUBCUTANEOUS
Qty: 6 ML | Refills: 1 | Status: SHIPPED | OUTPATIENT
Start: 2025-06-30

## 2025-07-19 LAB
25(OH)D3+25(OH)D2 SERPL-MCNC: 53 NG/ML (ref 30–100)
ALBUMIN SERPL-MCNC: 4.1 G/DL (ref 3.6–5.1)
ALBUMIN/CREAT UR: 2 MG/G CREAT
ALP SERPL-CCNC: 52 U/L (ref 36–130)
ALT SERPL-CCNC: 22 U/L (ref 9–46)
ANION GAP SERPL CALCULATED.4IONS-SCNC: 5 MMOL/L (CALC) (ref 7–17)
AST SERPL-CCNC: 20 U/L (ref 10–40)
BASOPHILS # BLD AUTO: 22 CELLS/UL (ref 0–200)
BASOPHILS NFR BLD AUTO: 0.5 %
BILIRUB SERPL-MCNC: 0.3 MG/DL (ref 0.2–1.2)
BUN SERPL-MCNC: 25 MG/DL (ref 7–25)
CALCIUM SERPL-MCNC: 9.3 MG/DL (ref 8.6–10.3)
CHLORIDE SERPL-SCNC: 105 MMOL/L (ref 98–110)
CHOLEST SERPL-MCNC: 131 MG/DL
CHOLEST/HDLC SERPL: 2.9 (CALC)
CO2 SERPL-SCNC: 32 MMOL/L (ref 20–32)
CREAT SERPL-MCNC: 0.96 MG/DL (ref 0.6–1.29)
CREAT UR-MCNC: 125 MG/DL (ref 20–320)
EGFRCR SERPLBLD CKD-EPI 2021: 97 ML/MIN/1.73M2
EOSINOPHIL # BLD AUTO: 88 CELLS/UL (ref 15–500)
EOSINOPHIL NFR BLD AUTO: 2 %
ERYTHROCYTE [DISTWIDTH] IN BLOOD BY AUTOMATED COUNT: 13 % (ref 11–15)
GLUCOSE SERPL-MCNC: 126 MG/DL (ref 65–99)
HCT VFR BLD AUTO: 42.1 % (ref 38.5–50)
HDLC SERPL-MCNC: 45 MG/DL
HGB BLD-MCNC: 14.1 G/DL (ref 13.2–17.1)
LDLC SERPL CALC-MCNC: 70 MG/DL (CALC)
LYMPHOCYTES # BLD AUTO: 1597 CELLS/UL (ref 850–3900)
LYMPHOCYTES NFR BLD AUTO: 36.3 %
MCH RBC QN AUTO: 30.3 PG (ref 27–33)
MCHC RBC AUTO-ENTMCNC: 33.5 G/DL (ref 32–36)
MCV RBC AUTO: 90.3 FL (ref 80–100)
MICROALBUMIN UR-MCNC: 0.2 MG/DL
MONOCYTES # BLD AUTO: 339 CELLS/UL (ref 200–950)
MONOCYTES NFR BLD AUTO: 7.7 %
NEUTROPHILS # BLD AUTO: 2354 CELLS/UL (ref 1500–7800)
NEUTROPHILS NFR BLD AUTO: 53.5 %
NONHDLC SERPL-MCNC: 86 MG/DL (CALC)
PLATELET # BLD AUTO: 196 THOUSAND/UL (ref 140–400)
PMV BLD REES-ECKER: 11.1 FL (ref 7.5–12.5)
POTASSIUM SERPL-SCNC: 5.1 MMOL/L (ref 3.5–5.3)
PROT SERPL-MCNC: 6.4 G/DL (ref 6.1–8.1)
RBC # BLD AUTO: 4.66 MILLION/UL (ref 4.2–5.8)
SODIUM SERPL-SCNC: 142 MMOL/L (ref 135–146)
TESTOST FREE SERPL-MCNC: NORMAL PG/ML
TESTOST SERPL-MCNC: NORMAL NG/DL
TRIGL SERPL-MCNC: 79 MG/DL
TSH SERPL-ACNC: 0.85 MIU/L (ref 0.4–4.5)
WBC # BLD AUTO: 4.4 THOUSAND/UL (ref 3.8–10.8)

## 2025-07-22 ENCOUNTER — RESULTS FOLLOW-UP (OUTPATIENT)
Dept: PRIMARY CARE | Facility: CLINIC | Age: 50
End: 2025-07-22
Payer: COMMERCIAL

## 2025-07-22 LAB
25(OH)D3+25(OH)D2 SERPL-MCNC: 53 NG/ML (ref 30–100)
ALBUMIN SERPL-MCNC: 4.1 G/DL (ref 3.6–5.1)
ALBUMIN/CREAT UR: 2 MG/G CREAT
ALP SERPL-CCNC: 52 U/L (ref 36–130)
ALT SERPL-CCNC: 22 U/L (ref 9–46)
ANION GAP SERPL CALCULATED.4IONS-SCNC: 5 MMOL/L (CALC) (ref 7–17)
AST SERPL-CCNC: 20 U/L (ref 10–40)
BASOPHILS # BLD AUTO: 22 CELLS/UL (ref 0–200)
BASOPHILS NFR BLD AUTO: 0.5 %
BILIRUB SERPL-MCNC: 0.3 MG/DL (ref 0.2–1.2)
BUN SERPL-MCNC: 25 MG/DL (ref 7–25)
CALCIUM SERPL-MCNC: 9.3 MG/DL (ref 8.6–10.3)
CHLORIDE SERPL-SCNC: 105 MMOL/L (ref 98–110)
CHOLEST SERPL-MCNC: 131 MG/DL
CHOLEST/HDLC SERPL: 2.9 (CALC)
CO2 SERPL-SCNC: 32 MMOL/L (ref 20–32)
CREAT SERPL-MCNC: 0.96 MG/DL (ref 0.6–1.29)
CREAT UR-MCNC: 125 MG/DL (ref 20–320)
EGFRCR SERPLBLD CKD-EPI 2021: 97 ML/MIN/1.73M2
EOSINOPHIL # BLD AUTO: 88 CELLS/UL (ref 15–500)
EOSINOPHIL NFR BLD AUTO: 2 %
ERYTHROCYTE [DISTWIDTH] IN BLOOD BY AUTOMATED COUNT: 13 % (ref 11–15)
GLUCOSE SERPL-MCNC: 126 MG/DL (ref 65–99)
HCT VFR BLD AUTO: 42.1 % (ref 38.5–50)
HDLC SERPL-MCNC: 45 MG/DL
HGB BLD-MCNC: 14.1 G/DL (ref 13.2–17.1)
LDLC SERPL CALC-MCNC: 70 MG/DL (CALC)
LYMPHOCYTES # BLD AUTO: 1597 CELLS/UL (ref 850–3900)
LYMPHOCYTES NFR BLD AUTO: 36.3 %
MCH RBC QN AUTO: 30.3 PG (ref 27–33)
MCHC RBC AUTO-ENTMCNC: 33.5 G/DL (ref 32–36)
MCV RBC AUTO: 90.3 FL (ref 80–100)
MICROALBUMIN UR-MCNC: 0.2 MG/DL
MONOCYTES # BLD AUTO: 339 CELLS/UL (ref 200–950)
MONOCYTES NFR BLD AUTO: 7.7 %
NEUTROPHILS # BLD AUTO: 2354 CELLS/UL (ref 1500–7800)
NEUTROPHILS NFR BLD AUTO: 53.5 %
NONHDLC SERPL-MCNC: 86 MG/DL (CALC)
PLATELET # BLD AUTO: 196 THOUSAND/UL (ref 140–400)
PMV BLD REES-ECKER: 11.1 FL (ref 7.5–12.5)
POTASSIUM SERPL-SCNC: 5.1 MMOL/L (ref 3.5–5.3)
PROT SERPL-MCNC: 6.4 G/DL (ref 6.1–8.1)
RBC # BLD AUTO: 4.66 MILLION/UL (ref 4.2–5.8)
SODIUM SERPL-SCNC: 142 MMOL/L (ref 135–146)
TESTOST FREE SERPL-MCNC: 92.4 PG/ML (ref 35–155)
TESTOST SERPL-MCNC: 534 NG/DL (ref 250–1100)
TRIGL SERPL-MCNC: 79 MG/DL
TSH SERPL-ACNC: 0.85 MIU/L (ref 0.4–4.5)
WBC # BLD AUTO: 4.4 THOUSAND/UL (ref 3.8–10.8)

## 2025-08-01 ENCOUNTER — OFFICE VISIT (OUTPATIENT)
Dept: PRIMARY CARE | Facility: CLINIC | Age: 50
End: 2025-08-01
Payer: COMMERCIAL

## 2025-08-01 VITALS
BODY MASS INDEX: 25.77 KG/M2 | SYSTOLIC BLOOD PRESSURE: 124 MMHG | WEIGHT: 174 LBS | DIASTOLIC BLOOD PRESSURE: 80 MMHG | HEIGHT: 69 IN | HEART RATE: 63 BPM | OXYGEN SATURATION: 97 % | RESPIRATION RATE: 16 BRPM

## 2025-08-01 DIAGNOSIS — Z00.00 ANNUAL PHYSICAL EXAM: ICD-10-CM

## 2025-08-01 DIAGNOSIS — E11.9 TYPE 2 DIABETES MELLITUS WITHOUT COMPLICATION, WITHOUT LONG-TERM CURRENT USE OF INSULIN: Primary | ICD-10-CM

## 2025-08-01 LAB — POC HEMOGLOBIN A1C: 5.5 % (ref 4.2–6.5)

## 2025-08-01 PROCEDURE — 3079F DIAST BP 80-89 MM HG: CPT

## 2025-08-01 PROCEDURE — 3074F SYST BP LT 130 MM HG: CPT

## 2025-08-01 PROCEDURE — 99396 PREV VISIT EST AGE 40-64: CPT

## 2025-08-01 PROCEDURE — 3044F HG A1C LEVEL LT 7.0%: CPT

## 2025-08-01 PROCEDURE — 3008F BODY MASS INDEX DOCD: CPT

## 2025-08-01 PROCEDURE — 83036 HEMOGLOBIN GLYCOSYLATED A1C: CPT

## 2025-08-01 ASSESSMENT — PAIN SCALES - GENERAL: PAINLEVEL_OUTOF10: 0-NO PAIN

## 2025-08-01 NOTE — PROGRESS NOTES
"Subjective   Patient ID: Juan Rodriguez is a 49 y.o. male who presents for Annual Exam.    HPI   Patient denies any falls, urgent care, ER, hospitalization, new diagnoses, surgeries since they were here last.     Denies any issues with chest pain, chest pressure,, constipation, diarrhea, blood in stool, urinary urgency, frequency, blood in urine, muscle weakness in arms and legs, numbness or tingling in fingers or toes.    Bilateral finger pain   Intermittently- stiffness  Defers xray at this time   SOB   Intermittent he feels like he can't get enough air in \"every one in a great while\"   Nocturia   Intermittent 3-4 most nights he is able to sleep through the night    Thoracic back pain this has been ongoing for several years.   Defers xray at this time.     Bilateral feet cold this has been ongoing     Review of Systems  Review of Systems negative except as noted in HPI and Chief complaint.  Current Medications[1]    Objective   /80 (BP Location: Left arm, Patient Position: Sitting, BP Cuff Size: Adult)   Pulse 63   Resp 16   Ht 1.753 m (5' 9\")   Wt 78.9 kg (174 lb)   SpO2 97%   BMI 25.70 kg/m²     Physical Exam  Vitals reviewed.   Constitutional:       General: He is not in acute distress.     Appearance: Normal appearance.   HENT:      Head: Normocephalic.      Right Ear: Tympanic membrane normal.      Left Ear: Tympanic membrane normal.      Nose: Nose normal.      Mouth/Throat:      Mouth: Mucous membranes are moist.      Pharynx: Oropharynx is clear.     Eyes:      Extraocular Movements: Extraocular movements intact.      Conjunctiva/sclera: Conjunctivae normal.      Pupils: Pupils are equal, round, and reactive to light.       Cardiovascular:      Rate and Rhythm: Normal rate.      Pulses: Normal pulses.      Heart sounds: Normal heart sounds.   Pulmonary:      Effort: Pulmonary effort is normal.      Breath sounds: Normal breath sounds.   Abdominal:      General: Abdomen is flat. Bowel sounds " are normal.      Palpations: Abdomen is soft.     Musculoskeletal:         General: Normal range of motion.     Skin:     General: Skin is warm and dry.      Capillary Refill: Capillary refill takes 2 to 3 seconds.     Neurological:      General: No focal deficit present.      Mental Status: He is alert and oriented to person, place, and time. Mental status is at baseline.      Cranial Nerves: Cranial nerves 2-12 are intact.     Psychiatric:         Mood and Affect: Mood normal.         Behavior: Behavior is cooperative.         Assessment/Plan   Assessment & Plan  Type 2 diabetes mellitus without complication, without long-term current use of insulin    Orders:    POCT glycosylated hemoglobin (Hb A1C) manually resulted    Annual physical exam      Vaccines are important! Please reconsider a flu shot and the Covid-19 vaccine  - Yearly seasonal flu shots are recommended, high dose is indicated for patient over 65.   - Tetanus boosters should be given every 10 yrs, this also covers for diphtheria and pertussis.   - most insurances cover the 2-shot series for shingles (shingrix) after the age of 50.   - Pneumonia vaccines are given routinely at age 65, however if you have a chronic heart or lung diagnosis this may be given before age 65.     Preventative cancer screens SAVE LIVES!  - mammograms are recommend yearly starting at the age of 40 in women, sometimes sooner based on family history.   - Prostate cancer screening is included in blood work in men over 40 every 2-4 years, unless risk high  - Colon cancer screening is recommended at age 45 for all patients, sometimes sooner based on family history.   - Cervical cancer screening through the use of a PAP test, is done on all women aged 21-65.   - other tests may be recommended if you are a smoker!     150 minutes of aerobic exercise is advised for good cardiovascular health and to maintain a healthy weight.   Please try to work on maintaining a healthy body weight,  with a BMI close to or at a BMI 19-25.    I recommend a whole-foods, plant-based diet, filled with fresh fruits, vegetables, seeds, beans, nuts and berries.    Discussed smoking cessation/Abstinence is best.     Abstaining from the use of alcohol is best. However if you choose to drink current guidelines are 2 or less drinks per day for men and 1.5 or less per day for women (and not all saved for one night on the weekend).    This note was dictated using DRAGON speech recognition software and was corrected for spelling or grammatical errors, but despite proofreading several typographical errors might be present that might affect the meaning of the content.  Arabella Beltran, CNP                    [1]   Current Outpatient Medications:     ibuprofen 400 mg tablet, Take 1 tablet (400 mg) by mouth every 6 hours if needed., Disp: , Rfl:     OneTouch Ultra2 Meter misc, Inject 1 each under the skin once daily., Disp: , Rfl:     pantoprazole (ProtoNix) 40 mg EC tablet, Take 1 tablet (40 mg) by mouth once daily., Disp: 90 tablet, Rfl: 1    tirzepatide (Mounjaro) 7.5 mg/0.5 mL pen injector, Inject 7.5 mg under the skin 1 (one) time per week., Disp: 6 mL, Rfl: 1